# Patient Record
Sex: FEMALE | Race: WHITE | NOT HISPANIC OR LATINO | Employment: FULL TIME
[De-identification: names, ages, dates, MRNs, and addresses within clinical notes are randomized per-mention and may not be internally consistent; named-entity substitution may affect disease eponyms.]

---

## 2018-10-02 ENCOUNTER — HEALTH MAINTENANCE LETTER (OUTPATIENT)
Age: 43
End: 2018-10-02

## 2018-10-12 ENCOUNTER — ANESTHESIA (OUTPATIENT)
Dept: SURGERY | Facility: CLINIC | Age: 43
End: 2018-10-12
Payer: COMMERCIAL

## 2018-10-12 ENCOUNTER — HOSPITAL ENCOUNTER (OUTPATIENT)
Facility: CLINIC | Age: 43
Discharge: HOME | End: 2018-10-12
Attending: OBSTETRICS & GYNECOLOGY | Admitting: OBSTETRICS & GYNECOLOGY
Payer: COMMERCIAL

## 2018-10-12 ENCOUNTER — ANESTHESIA EVENT (OUTPATIENT)
Dept: SURGERY | Facility: CLINIC | Age: 43
End: 2018-10-12
Payer: COMMERCIAL

## 2018-10-12 ENCOUNTER — SURGERY (OUTPATIENT)
Age: 43
End: 2018-10-12

## 2018-10-12 VITALS
SYSTOLIC BLOOD PRESSURE: 113 MMHG | WEIGHT: 145.5 LBS | HEIGHT: 66 IN | TEMPERATURE: 99.7 F | BODY MASS INDEX: 23.38 KG/M2 | OXYGEN SATURATION: 99 % | RESPIRATION RATE: 14 BRPM | DIASTOLIC BLOOD PRESSURE: 72 MMHG

## 2018-10-12 DIAGNOSIS — R10.2 CYCLICAL PELVIC PAIN: Primary | ICD-10-CM

## 2018-10-12 DIAGNOSIS — Z90.710 STATUS POST LAPAROSCOPIC ASSISTED VAGINAL HYSTERECTOMY: ICD-10-CM

## 2018-10-12 LAB
ABO + RH BLD: NORMAL
ABO + RH BLD: NORMAL
B-HCG SERPL-ACNC: <1 IU/L (ref 0–5)
BLD GP AB SCN SERPL QL: NORMAL
BLOOD BANK CMNT PATIENT-IMP: NORMAL
HGB BLD-MCNC: 13.3 G/DL (ref 11.7–15.7)
SPECIMEN EXP DATE BLD: NORMAL

## 2018-10-12 PROCEDURE — 71000027 ZZH RECOVERY PHASE 2 EACH 15 MINS: Performed by: OBSTETRICS & GYNECOLOGY

## 2018-10-12 PROCEDURE — 88307 TISSUE EXAM BY PATHOLOGIST: CPT | Performed by: OBSTETRICS & GYNECOLOGY

## 2018-10-12 PROCEDURE — 36415 COLL VENOUS BLD VENIPUNCTURE: CPT | Performed by: OBSTETRICS & GYNECOLOGY

## 2018-10-12 PROCEDURE — 25000566 ZZH SEVOFLURANE, EA 15 MIN: Performed by: OBSTETRICS & GYNECOLOGY

## 2018-10-12 PROCEDURE — 25000128 H RX IP 250 OP 636: Performed by: OBSTETRICS & GYNECOLOGY

## 2018-10-12 PROCEDURE — 84702 CHORIONIC GONADOTROPIN TEST: CPT | Performed by: OBSTETRICS & GYNECOLOGY

## 2018-10-12 PROCEDURE — 25000128 H RX IP 250 OP 636: Performed by: NURSE ANESTHETIST, CERTIFIED REGISTERED

## 2018-10-12 PROCEDURE — 71000012 ZZH RECOVERY PHASE 1 LEVEL 1 FIRST HR: Performed by: OBSTETRICS & GYNECOLOGY

## 2018-10-12 PROCEDURE — 86850 RBC ANTIBODY SCREEN: CPT | Performed by: OBSTETRICS & GYNECOLOGY

## 2018-10-12 PROCEDURE — 27210794 ZZH OR GENERAL SUPPLY STERILE: Performed by: OBSTETRICS & GYNECOLOGY

## 2018-10-12 PROCEDURE — 25000125 ZZHC RX 250: Performed by: NURSE ANESTHETIST, CERTIFIED REGISTERED

## 2018-10-12 PROCEDURE — 86901 BLOOD TYPING SEROLOGIC RH(D): CPT | Performed by: OBSTETRICS & GYNECOLOGY

## 2018-10-12 PROCEDURE — 27210995 ZZH RX 272: Performed by: OBSTETRICS & GYNECOLOGY

## 2018-10-12 PROCEDURE — 36000063 ZZH SURGERY LEVEL 4 EA 15 ADDTL MIN: Performed by: OBSTETRICS & GYNECOLOGY

## 2018-10-12 PROCEDURE — 25000128 H RX IP 250 OP 636: Performed by: ANESTHESIOLOGY

## 2018-10-12 PROCEDURE — 37000009 ZZH ANESTHESIA TECHNICAL FEE, EACH ADDTL 15 MIN: Performed by: OBSTETRICS & GYNECOLOGY

## 2018-10-12 PROCEDURE — 25000132 ZZH RX MED GY IP 250 OP 250 PS 637: Performed by: OBSTETRICS & GYNECOLOGY

## 2018-10-12 PROCEDURE — 86900 BLOOD TYPING SEROLOGIC ABO: CPT | Performed by: OBSTETRICS & GYNECOLOGY

## 2018-10-12 PROCEDURE — 85018 HEMOGLOBIN: CPT | Performed by: OBSTETRICS & GYNECOLOGY

## 2018-10-12 PROCEDURE — 71000013 ZZH RECOVERY PHASE 1 LEVEL 1 EA ADDTL HR: Performed by: OBSTETRICS & GYNECOLOGY

## 2018-10-12 PROCEDURE — 37000008 ZZH ANESTHESIA TECHNICAL FEE, 1ST 30 MIN: Performed by: OBSTETRICS & GYNECOLOGY

## 2018-10-12 PROCEDURE — 25000125 ZZHC RX 250: Performed by: ANESTHESIOLOGY

## 2018-10-12 PROCEDURE — 36000093 ZZH SURGERY LEVEL 4 1ST 30 MIN: Performed by: OBSTETRICS & GYNECOLOGY

## 2018-10-12 PROCEDURE — C1765 ADHESION BARRIER: HCPCS | Performed by: OBSTETRICS & GYNECOLOGY

## 2018-10-12 PROCEDURE — 88307 TISSUE EXAM BY PATHOLOGIST: CPT | Mod: 26 | Performed by: OBSTETRICS & GYNECOLOGY

## 2018-10-12 PROCEDURE — 40000170 ZZH STATISTIC PRE-PROCEDURE ASSESSMENT II: Performed by: OBSTETRICS & GYNECOLOGY

## 2018-10-12 RX ORDER — VASOPRESSIN 20 U/ML
INJECTION PARENTERAL
Status: DISCONTINUED
Start: 2018-10-12 | End: 2018-10-12 | Stop reason: HOSPADM

## 2018-10-12 RX ORDER — HYDROMORPHONE HYDROCHLORIDE 1 MG/ML
.3-.5 INJECTION, SOLUTION INTRAMUSCULAR; INTRAVENOUS; SUBCUTANEOUS EVERY 10 MIN PRN
Status: DISCONTINUED | OUTPATIENT
Start: 2018-10-12 | End: 2018-10-12 | Stop reason: HOSPADM

## 2018-10-12 RX ORDER — OXYCODONE HYDROCHLORIDE 5 MG/1
5-10 TABLET ORAL
Qty: 20 TABLET | Refills: 0 | Status: SHIPPED | OUTPATIENT
Start: 2018-10-12 | End: 2019-10-24

## 2018-10-12 RX ORDER — MEPERIDINE HYDROCHLORIDE 25 MG/ML
12.5 INJECTION INTRAMUSCULAR; INTRAVENOUS; SUBCUTANEOUS
Status: DISCONTINUED | OUTPATIENT
Start: 2018-10-12 | End: 2018-10-12 | Stop reason: HOSPADM

## 2018-10-12 RX ORDER — ONDANSETRON 2 MG/ML
4 INJECTION INTRAMUSCULAR; INTRAVENOUS EVERY 30 MIN PRN
Status: DISCONTINUED | OUTPATIENT
Start: 2018-10-12 | End: 2018-10-12 | Stop reason: HOSPADM

## 2018-10-12 RX ORDER — FENTANYL CITRATE 50 UG/ML
INJECTION, SOLUTION INTRAMUSCULAR; INTRAVENOUS PRN
Status: DISCONTINUED | OUTPATIENT
Start: 2018-10-12 | End: 2018-10-12

## 2018-10-12 RX ORDER — KETOROLAC TROMETHAMINE 30 MG/ML
INJECTION, SOLUTION INTRAMUSCULAR; INTRAVENOUS PRN
Status: DISCONTINUED | OUTPATIENT
Start: 2018-10-12 | End: 2018-10-12

## 2018-10-12 RX ORDER — NALOXONE HYDROCHLORIDE 0.4 MG/ML
.1-.4 INJECTION, SOLUTION INTRAMUSCULAR; INTRAVENOUS; SUBCUTANEOUS
Status: DISCONTINUED | OUTPATIENT
Start: 2018-10-12 | End: 2018-10-12 | Stop reason: HOSPADM

## 2018-10-12 RX ORDER — ONDANSETRON 4 MG/1
4 TABLET, ORALLY DISINTEGRATING ORAL EVERY 30 MIN PRN
Status: DISCONTINUED | OUTPATIENT
Start: 2018-10-12 | End: 2018-10-12 | Stop reason: HOSPADM

## 2018-10-12 RX ORDER — SIMETHICONE 80 MG
80 TABLET,CHEWABLE ORAL EVERY 6 HOURS PRN
Qty: 30 TABLET | Refills: 0 | Status: SHIPPED | OUTPATIENT
Start: 2018-10-12 | End: 2019-10-24

## 2018-10-12 RX ORDER — FENTANYL CITRATE 50 UG/ML
25-50 INJECTION, SOLUTION INTRAMUSCULAR; INTRAVENOUS
Status: DISCONTINUED | OUTPATIENT
Start: 2018-10-12 | End: 2018-10-12 | Stop reason: HOSPADM

## 2018-10-12 RX ORDER — ACETAMINOPHEN 325 MG/1
650 TABLET ORAL EVERY 4 HOURS PRN
Qty: 100 TABLET | Refills: 0 | Status: SHIPPED | OUTPATIENT
Start: 2018-10-12 | End: 2019-10-24

## 2018-10-12 RX ORDER — ONDANSETRON 4 MG/1
4 TABLET, ORALLY DISINTEGRATING ORAL
Status: DISCONTINUED | OUTPATIENT
Start: 2018-10-12 | End: 2018-10-12 | Stop reason: HOSPADM

## 2018-10-12 RX ORDER — ONDANSETRON 2 MG/ML
INJECTION INTRAMUSCULAR; INTRAVENOUS PRN
Status: DISCONTINUED | OUTPATIENT
Start: 2018-10-12 | End: 2018-10-12

## 2018-10-12 RX ORDER — SODIUM CHLORIDE, SODIUM LACTATE, POTASSIUM CHLORIDE, CALCIUM CHLORIDE 600; 310; 30; 20 MG/100ML; MG/100ML; MG/100ML; MG/100ML
INJECTION, SOLUTION INTRAVENOUS CONTINUOUS
Status: DISCONTINUED | OUTPATIENT
Start: 2018-10-12 | End: 2018-10-12 | Stop reason: HOSPADM

## 2018-10-12 RX ORDER — PROPOFOL 10 MG/ML
INJECTION, EMULSION INTRAVENOUS CONTINUOUS PRN
Status: DISCONTINUED | OUTPATIENT
Start: 2018-10-12 | End: 2018-10-12

## 2018-10-12 RX ORDER — DEXAMETHASONE SODIUM PHOSPHATE 4 MG/ML
INJECTION, SOLUTION INTRA-ARTICULAR; INTRALESIONAL; INTRAMUSCULAR; INTRAVENOUS; SOFT TISSUE PRN
Status: DISCONTINUED | OUTPATIENT
Start: 2018-10-12 | End: 2018-10-12

## 2018-10-12 RX ORDER — LIDOCAINE HYDROCHLORIDE 20 MG/ML
INJECTION, SOLUTION INFILTRATION; PERINEURAL PRN
Status: DISCONTINUED | OUTPATIENT
Start: 2018-10-12 | End: 2018-10-12

## 2018-10-12 RX ORDER — OXYCODONE HYDROCHLORIDE 5 MG/1
5 TABLET ORAL
Status: COMPLETED | OUTPATIENT
Start: 2018-10-12 | End: 2018-10-12

## 2018-10-12 RX ORDER — SCOLOPAMINE TRANSDERMAL SYSTEM 1 MG/1
1 PATCH, EXTENDED RELEASE TRANSDERMAL ONCE
Status: COMPLETED | OUTPATIENT
Start: 2018-10-12 | End: 2018-10-12

## 2018-10-12 RX ORDER — CEFAZOLIN SODIUM 1 G/3ML
1 INJECTION, POWDER, FOR SOLUTION INTRAMUSCULAR; INTRAVENOUS SEE ADMIN INSTRUCTIONS
Status: DISCONTINUED | OUTPATIENT
Start: 2018-10-12 | End: 2018-10-12 | Stop reason: HOSPADM

## 2018-10-12 RX ORDER — SODIUM CHLORIDE, SODIUM LACTATE, POTASSIUM CHLORIDE, CALCIUM CHLORIDE 600; 310; 30; 20 MG/100ML; MG/100ML; MG/100ML; MG/100ML
INJECTION, SOLUTION INTRAVENOUS CONTINUOUS PRN
Status: DISCONTINUED | OUTPATIENT
Start: 2018-10-12 | End: 2018-10-12

## 2018-10-12 RX ORDER — ACETAMINOPHEN 325 MG/1
975 TABLET ORAL ONCE
Status: COMPLETED | OUTPATIENT
Start: 2018-10-12 | End: 2018-10-12

## 2018-10-12 RX ORDER — GLYCOPYRROLATE 0.2 MG/ML
INJECTION, SOLUTION INTRAMUSCULAR; INTRAVENOUS PRN
Status: DISCONTINUED | OUTPATIENT
Start: 2018-10-12 | End: 2018-10-12

## 2018-10-12 RX ORDER — IBUPROFEN 600 MG/1
600 TABLET, FILM COATED ORAL EVERY 6 HOURS PRN
Qty: 60 TABLET | Refills: 0 | Status: SHIPPED | OUTPATIENT
Start: 2018-10-12 | End: 2019-10-24

## 2018-10-12 RX ORDER — GABAPENTIN 300 MG/1
300 CAPSULE ORAL ONCE
Status: COMPLETED | OUTPATIENT
Start: 2018-10-12 | End: 2018-10-12

## 2018-10-12 RX ORDER — PROPOFOL 10 MG/ML
INJECTION, EMULSION INTRAVENOUS PRN
Status: DISCONTINUED | OUTPATIENT
Start: 2018-10-12 | End: 2018-10-12

## 2018-10-12 RX ORDER — CEFAZOLIN SODIUM 2 G/100ML
2 INJECTION, SOLUTION INTRAVENOUS
Status: DISCONTINUED | OUTPATIENT
Start: 2018-10-12 | End: 2018-10-12 | Stop reason: HOSPADM

## 2018-10-12 RX ORDER — NEOSTIGMINE METHYLSULFATE 1 MG/ML
VIAL (ML) INJECTION PRN
Status: DISCONTINUED | OUTPATIENT
Start: 2018-10-12 | End: 2018-10-12

## 2018-10-12 RX ORDER — BUPIVACAINE HYDROCHLORIDE 5 MG/ML
INJECTION, SOLUTION EPIDURAL; INTRACAUDAL
Status: DISCONTINUED
Start: 2018-10-12 | End: 2018-10-12 | Stop reason: HOSPADM

## 2018-10-12 RX ADMIN — NEOSTIGMINE METHYLSULFATE 3 MG: 1 INJECTION, SOLUTION INTRAVENOUS at 13:18

## 2018-10-12 RX ADMIN — PROPOFOL 200 MG: 10 INJECTION, EMULSION INTRAVENOUS at 11:28

## 2018-10-12 RX ADMIN — BUPIVACAINE HYDROCHLORIDE 6 ML: 5 INJECTION, SOLUTION PERINEURAL at 13:15

## 2018-10-12 RX ADMIN — FENTANYL CITRATE 50 MCG: 50 INJECTION, SOLUTION INTRAMUSCULAR; INTRAVENOUS at 14:08

## 2018-10-12 RX ADMIN — PROPOFOL 150 MCG/KG/MIN: 10 INJECTION, EMULSION INTRAVENOUS at 11:33

## 2018-10-12 RX ADMIN — FENTANYL CITRATE 50 MCG: 50 INJECTION, SOLUTION INTRAMUSCULAR; INTRAVENOUS at 13:49

## 2018-10-12 RX ADMIN — VASOPRESSIN 9 ML: 20 INJECTION INTRAVENOUS at 12:35

## 2018-10-12 RX ADMIN — SODIUM CHLORIDE, POTASSIUM CHLORIDE, SODIUM LACTATE AND CALCIUM CHLORIDE: 600; 310; 30; 20 INJECTION, SOLUTION INTRAVENOUS at 12:49

## 2018-10-12 RX ADMIN — ONDANSETRON 4 MG: 2 INJECTION INTRAMUSCULAR; INTRAVENOUS at 13:03

## 2018-10-12 RX ADMIN — ROCURONIUM BROMIDE 15 MG: 10 INJECTION INTRAVENOUS at 12:17

## 2018-10-12 RX ADMIN — HYDROMORPHONE HYDROCHLORIDE 0.25 MG: 1 INJECTION, SOLUTION INTRAMUSCULAR; INTRAVENOUS; SUBCUTANEOUS at 12:06

## 2018-10-12 RX ADMIN — SODIUM CHLORIDE, POTASSIUM CHLORIDE, SODIUM LACTATE AND CALCIUM CHLORIDE: 600; 310; 30; 20 INJECTION, SOLUTION INTRAVENOUS at 11:28

## 2018-10-12 RX ADMIN — DEXAMETHASONE SODIUM PHOSPHATE 4 MG: 4 INJECTION, SOLUTION INTRA-ARTICULAR; INTRALESIONAL; INTRAMUSCULAR; INTRAVENOUS; SOFT TISSUE at 11:33

## 2018-10-12 RX ADMIN — CEFAZOLIN 2 G: 1 INJECTION, POWDER, FOR SOLUTION INTRAMUSCULAR; INTRAVENOUS at 11:41

## 2018-10-12 RX ADMIN — KETOROLAC TROMETHAMINE 30 MG: 30 INJECTION, SOLUTION INTRAMUSCULAR at 13:03

## 2018-10-12 RX ADMIN — OXYCODONE HYDROCHLORIDE 5 MG: 5 TABLET ORAL at 15:01

## 2018-10-12 RX ADMIN — FENTANYL CITRATE 50 MCG: 50 INJECTION, SOLUTION INTRAMUSCULAR; INTRAVENOUS at 11:50

## 2018-10-12 RX ADMIN — LIDOCAINE HYDROCHLORIDE 100 MG: 20 INJECTION, SOLUTION INFILTRATION; PERINEURAL at 11:33

## 2018-10-12 RX ADMIN — DEXMEDETOMIDINE HYDROCHLORIDE 12 MCG: 100 INJECTION, SOLUTION INTRAVENOUS at 11:48

## 2018-10-12 RX ADMIN — GLYCOPYRROLATE 0.6 MG: 0.2 INJECTION, SOLUTION INTRAMUSCULAR; INTRAVENOUS at 13:18

## 2018-10-12 RX ADMIN — Medication 1 VIAL: at 13:25

## 2018-10-12 RX ADMIN — FENTANYL CITRATE 50 MCG: 50 INJECTION, SOLUTION INTRAMUSCULAR; INTRAVENOUS at 11:57

## 2018-10-12 RX ADMIN — SCOPALAMINE 1 PATCH: 1 PATCH, EXTENDED RELEASE TRANSDERMAL at 09:44

## 2018-10-12 RX ADMIN — ROCURONIUM BROMIDE 50 MG: 10 INJECTION INTRAVENOUS at 11:33

## 2018-10-12 RX ADMIN — HYDROMORPHONE HYDROCHLORIDE 0.25 MG: 1 INJECTION, SOLUTION INTRAMUSCULAR; INTRAVENOUS; SUBCUTANEOUS at 12:25

## 2018-10-12 RX ADMIN — ROCURONIUM BROMIDE 10 MG: 10 INJECTION INTRAVENOUS at 12:35

## 2018-10-12 RX ADMIN — DEXMEDETOMIDINE HYDROCHLORIDE 8 MCG: 100 INJECTION, SOLUTION INTRAVENOUS at 11:57

## 2018-10-12 RX ADMIN — MIDAZOLAM 2 MG: 1 INJECTION INTRAMUSCULAR; INTRAVENOUS at 11:28

## 2018-10-12 RX ADMIN — FENTANYL CITRATE 100 MCG: 50 INJECTION, SOLUTION INTRAMUSCULAR; INTRAVENOUS at 11:33

## 2018-10-12 RX ADMIN — Medication 0.5 MG: at 14:30

## 2018-10-12 RX ADMIN — GABAPENTIN 300 MG: 300 CAPSULE ORAL at 09:44

## 2018-10-12 RX ADMIN — ACETAMINOPHEN 975 MG: 325 TABLET, FILM COATED ORAL at 09:33

## 2018-10-12 RX ADMIN — ROCURONIUM BROMIDE 25 MG: 10 INJECTION INTRAVENOUS at 11:52

## 2018-10-12 RX ADMIN — ONDANSETRON 4 MG: 2 INJECTION INTRAMUSCULAR; INTRAVENOUS at 15:41

## 2018-10-12 ASSESSMENT — LIFESTYLE VARIABLES: TOBACCO_USE: 0

## 2018-10-12 NOTE — IP AVS SNAPSHOT
[facility]    [address]    [place] [address]    Phone:  [phone]    Fax:  [phone]                                       After Visit Summary   10/12/2018    Jamee Demarco    MRN: [id number]           After Visit Summary Signature Page     I have received my discharge instructions, and my questions have been answered. I have discussed any challenges I see with this plan with the nurse or doctor.    ..........................................................................................................................................  Patient/Patient Representative Signature      ..........................................................................................................................................  Patient Representative Print Name and Relationship to Patient    ..................................................               ................................................  Date                                   Time    ..........................................................................................................................................  Reviewed by Signature/Title    ...................................................              ..............................................  Date                                               Time          22EPIC Rev 08/18

## 2018-10-12 NOTE — DISCHARGE INSTRUCTIONS
Please call the office if you experience  - bleeding through more than one pad per hour persistently  - passage of blood clots greater than the size of jovan  - temperature greater than 100.4  - abnormal vaginal discharge  - inability to tolerate food or fluids  - pain uncontrolled with oral medications  - drainage, redness, or bleeding from incision    Your incisions have suture that will absorb under the skin and essentially a super glue of the skin on top that helps the skin stay together during the initial stages of healing.     You should take ibuprofen and acetaminophen alternating for the pain and oxycodone as needed for pain uncontrolled with those medications.     Simethicone will help with the gas pain that is common after laparoscopic surgery.     No driving while taking narcotic medications or within first week of surgery.    Please make an appointment to follow-up in the office in 2 and 6 weeks.    You will be out of work for likely 4 weeks. We can readdress at your 2 week postop appointment.         DISCHARGE INSTRUCTIONS  FOLLOWING LAPAROSCOPY    ACTIVITY:  You may resume normal activities including lifting as your abdominal discomfort disappears.  You may return to work after two days if you feel well enough.  Possible abdominal and shoulder discomfort due to gas remaining in the abdomen should be gone within 48 hours.  It is permissibleto climb stairs. Showers are perfectly acceptable.  You may drive a car after 24 hours as long as you are not taking narcotic pain pills.    CHECK-UP:  You should be seen one month after discharge unless home instruction sheet states otherwise and please phone the office the day after discharge and schedule an appointment with your physician.    VAGINAL DISCHARGE:  You may have some vaginal bleeding or discharge for about a week after discharge.  You should avoid douches, tampons, and intercourse for the first week.    TEMPERATURE:  If you develop temperature  levels to over 100.4  your physician should be called imlmediately.      STITCHES:  There is usually a stitch under the skin incisions which will dissolve and does not need to be removed.  The Band-Aids may be removed at any time.    DIET:  Como or light diet is advisable the day of surgery.  If nausea persists, continue this diet.  If severe, call.    Please call the office for increasingly severe abdominal pain or vaginal bleeding in excess of one pad per hour.  This will rarely be a problem.[email or web address]    [facility]  [address], [address]  [place], [place] [address]  [phone]    **If you have questions or concerns about your procedure,   call Dr Ina Fulton at  [phone]**      Same Day Surgery Discharge Instructions for  Sedation and General Anesthesia       It's not unusual to feel dizzy, light-headed or faint for up to 24 hours after surgery or while taking pain medication.  If you have these symptoms: sit for a few minutes before standing and have someone assist you when you get up to walk or use the bathroom.      You should rest and relax for the next 24 hours. We recommend you make arrangements to have an adult stay with you for at least 24 hours after your discharge.  Avoid hazardous and strenuous activity.      DO NOT DRIVE any vehicle or operate mechanical equipment for 24 hours following the end of your surgery.  Even though you may feel normal, your reactions may be affected by the medication you have received.      Do not drink alcoholic beverages for 24 hours following surgery.       Slowly progress to your regular diet as you feel able. It's not unusual to feel nauseated and/or vomit after receiving anesthesia.  If you develop these symptoms, drink clear liquids (apple juice, ginger ale, broth, 7-up, etc. ) until you feel better.  If your nausea and vomiting persists for 24 hours, please notify your surgeon.        All narcotic pain medications, along with inactivity and  anesthesia, can cause constipation. Drinking plenty of liquids and increasing fiber intake will help.      For any questions of a medical nature, call your surgeon.      Do not make important decisions for 24 hours.      If you had general anesthesia, you may have a sore throat for a couple of days related to the breathing tube used during surgery.  You may use Cepacol lozenges to help with this discomfort.  If it worsens or if you develop a fever, contact your surgeon.       If you feel your pain is not well managed with the pain medications prescribed by your surgeon, please contact your surgeon's office to let them know so they can address your concerns.       Information for Patients Discharging with a Transderm Scopolamine Patch       Dry mouth is a common side effect.    Drowsiness is another common side effect especially when combined with pain medication.  Please avoid activities that require mental alertness such as driving a car or making important legal decisions.    Since Scopolamine can cause temporary dilation of the pupils and blurred vision if it comes in contact with the eyes; be sure to wash your hands thoroughly with soap and water immediately after handling the patch.   When you remove your patch, please stick it to a tissue or paper towel for disposal.      Remove the patch immediately and contact a physician in the unlikely event that you experience symptoms of acute glaucoma (pain and reddening of the eyes, accompanied by dilated pupils).    Remove the patch if you develop any difficulties urinating.  If you cannot urinate after removing your patch, please notify your surgeon.    Remove the patch 24 hours after surgery.        Today you were given gabapentin (Neurontin) 300mg at 9:44 A.M.       Today you were given 975 mg of Tylenol at 9:33 A.M. The recommended daily maximum dose is 4000 mg.     Today you received Toradol, an antiinflammatory medication similar to Ibuprofen.  You should not  take other antiinflammatory medication, such as Ibuprofen, Motrin, Advil, Aleve, Naprosyn, etc until 7:00 P.M.

## 2018-10-12 NOTE — OP NOTE
"Gyn Operative Note    Jamee Demarco  1975 October 12, 2018      PREOPERATIVE DIAGNOSIS:  Cyclic pelvic pain after endometrial ablation and tubal ligation    POSTOPERATIVE DIAGNOSIS:  Same, small fibroids    PROCEDURE: Laparoscopic-assisted vaginal hysterectomy, bilateral salpingectomy, cystoscopy    ANESTHESIA:  General endotracheal    SURGEON:  Ina Fulton MD    ASSISTANT: MARICEL Ny    EBL: 150 mL    IVF: 1100 mL    UOP: 400 mL    FINDINGS: 8 wk size anteverted uterus with 2 subcentimeter subserosal fibroids at fundus, normal tubal remnants, normal ovaries, normal liver edge, normal stomach edge, normal bladder interior on cystoscopy at conclusion of the case, bilateral ureteral jets visualized on cystoscopy at conclusion of the case    SPECIMENS: portions of bilateral fallopian tubes, cervix, uterus    COMPLICATIONS: none    INDICATION AND CONSENT:   This is a 49 year old who presented for cyclic pelvic pain. She underwent endometrial ablation and bilateral tubal ligation in 2011. For about 7 months, she has been experiencing cyclic pelvic pain requiring scheduled pain medications throughout the days of the presumed \"period\", although she has had no vaginal bleeding. 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 Options for management including hormonal contraception, repeat endometrial ablation, and hysterectomy were discussed, including their risks and benefits. The patient desired definitive management via hysterectomy.Discussed risks of surgery including bleeding, infection, injury to surrounding structures, need for reoperation, need for blood transfusion. She expressed understanding and consented to proceed.     PROCEDURE:      The patient was brought to the operating room with IV fluids running. IV antibiotics were given for infection prophylaxis. General anesthesia was induced. She was prepped and draped in the dorsal lithotomy position. A timeout was performed to identify the " patient and procedure.      A Zheng catheter was placed to gravity. A speculum was placed in the vagina. The anterior lip of the cervix was grasped with a tenaculum. An Rui uterine manipulator was placed via the cervix. The speculum was removed.      Attention was directed to the patients abdomen. A 5 mm incision was created at the base of the umbilicus with a scalpel. The abdominal wall was elevated. The Veress needle was inserted at this incision. The CO2 gas tubing was attached to the Veress needle. Opening pressure was found to be 2 mmHg. The abdomen was insufflated to a pressure of 15 mmHg. The Veress needle was removed. The scope was inserted into a 5 mm Optiview trocar which was placed at the site of the incision so that the abdomen could be entered under direct visualization.     Bilateral 5 mm ports were placed with blunt trocars in the lower quadrants with care to avoid the inferior epigastric vessels.     The right fallopian tube remnant was grasped with an Alligator clamp. The Ligasure was used to resect the tube along the mesosalpinx and it was removed via the right lower quadrant port. The same was performed on the left.    The left round ligament was transected. The anterior leaf of the broad was dissected down anteriorly and the bladder flap developed.     The uteroovarian vessels were transected and the posterior leaf of the broad was developed, dissecting the uterine vessels. The uterine vessels were cauterized and cut.    These steps were repeated on the right side.    Hemostasis was confirmed at all sites.     Attention was then turned to the vagina. The manipulator was removed. A weighted speculum was placed posteriorly and a Armando anteriorly. The cervix was grasped with two vertical tenaculums. The cervix was injected circumferentially with vasopressin. A circumferential incision was made around the cervix with the Bovie. The anterior portion was grasped with an Allis clamp and the  pubocervical fascia was dissected anteriorly with a Metzenbaum scissors until the pelvis was entered anteriorly. The Armando was repositioned within this opening. The posterior cul de sac was then grasped with an Allis clamp and the pelvis was entered posteriorly. The weighted speculum was repositioned within this opening.     The uterosacral ligament was grasped with a Todd clamp on the right. This was cut with a Sal scissors and sutured with an 0 Vicryl pop-off and transfixed. The same was done on the right. This same process was repeated sequentially until the uterus was free from its pelvic attachments. The uterus was removed via the vagina.     The vaginal cuff with closed with 0 Vicryl in a running fashion, incorporating the uterosacral ligaments bilaterally. Hemostasis was achieved.    The Zheng catheter was removed. The cystoscope was obtained and placed into the bladder via the urethra with the findings noted above. The cystoscope was removed. The Zheng catheter was replaced.    Attention was turned again to the abdomen. The laparoscope was inserted and hemostasis was noted throughout the pelvis. The pelvis was copiously irrigated. Surgicel powder was placed over the vaginal cuff. The laparoscope was removed.     CO2 gas was allowed to escape from the abdomen.  The accessory ports were removed under direct visualization.  The umbilical port was removed. The skin of all incisions was closed with 4-0 Monocryl.     The vaginal cuff was palpated intact on vaginal exam and the Zheng catheter was removed.    The patient tolerated the procedure well. All counts were correct. The patient was brought to the recovery room in stable condition.    Ina Fulton MD

## 2018-10-12 NOTE — IP AVS SNAPSHOT
MRN:[id number]                      After Visit Summary   10/12/2018    Jamee Demarco    MRN: [id number]           Thank you!     Thank you for choosing [facility] for your care. Our goal is always to provide you with excellent care. Hearing back from our patients is one way we can continue to improve our services. Please take a few minutes to complete the written survey that you may receive in the mail after you visit with us. Thank you!        Patient Information     Date Of Birth          1975        About your hospital stay     You were admitted on:  October 12, 2018 [facility]    You were discharged on:  October 12, 2018       Who to Call     For medical emergencies, please call 911.  For non-urgent questions about your medical care, please call your primary care provider or clinic, [phone]  For questions related to your surgery, please call your surgery clinic        Attending Provider     Provider Ina Guerra MD OB/Gyn       Primary Care Provider Office Phone # Fax #    Karin Cárdenas [phone]      Further instructions from your care team         Please call the office if you experience  - bleeding through more than one pad per hour persistently  - passage of blood clots greater than the size of jovan  - temperature greater than 100.4  - abnormal vaginal discharge  - inability to tolerate food or fluids  - pain uncontrolled with oral medications  - drainage, redness, or bleeding from incision    Your incisions have suture that will absorb under the skin and essentially a super glue of the skin on top that helps the skin stay together during the initial stages of healing.     You should take ibuprofen and acetaminophen alternating for the pain and oxycodone as needed for pain uncontrolled with those medications.     Simethicone will help with the gas pain that is common after  laparoscopic surgery.     No driving while taking narcotic medications or within first week of surgery.    Please make an appointment to follow-up in the office in 2 and 6 weeks.    You will be out of work for likely 4 weeks. We can readdress at your 2 week postop appointment.         DISCHARGE INSTRUCTIONS  FOLLOWING LAPAROSCOPY    ACTIVITY:  You may resume normal activities including lifting as your abdominal discomfort disappears.  You may return to work after two days if you feel well enough.  Possible abdominal and shoulder discomfort due to gas remaining in the abdomen should be gone within 48 hours.  It is permissibleto climb stairs. Showers are perfectly acceptable.  You may drive a car after 24 hours as long as you are not taking narcotic pain pills.    CHECK-UP:  You should be seen one month after discharge unless home instruction sheet states otherwise and please phone the office the day after discharge and schedule an appointment with your physician.    VAGINAL DISCHARGE:  You may have some vaginal bleeding or discharge for about a week after discharge.  You should avoid douches, tampons, and intercourse for the first week.    TEMPERATURE:  If you develop temperature levels to over 100.4  your physician should be called imlmediately.      STITCHES:  There is usually a stitch under the skin incisions which will dissolve and does not need to be removed.  The Band-Aids may be removed at any time.    DIET:  [place] or light diet is advisable the day of surgery.  If nausea persists, continue this diet.  If severe, call.    Please call the office for increasingly severe abdominal pain or vaginal bleeding in excess of one pad per hour.  This will rarely be a problem.lapar    [facility]  [address], [address]  [place] [address]  [phone]    **If you have questions or concerns about your procedure,   call Dr Ina Fulton at  [phone]**      Same Day Surgery Discharge  Instructions for  Sedation and General Anesthesia       It's not unusual to feel dizzy, light-headed or faint for up to 24 hours after surgery or while taking pain medication.  If you have these symptoms: sit for a few minutes before standing and have someone assist you when you get up to walk or use the bathroom.      You should rest and relax for the next 24 hours. We recommend you make arrangements to have an adult stay with you for at least 24 hours after your discharge.  Avoid hazardous and strenuous activity.      DO NOT DRIVE any vehicle or operate mechanical equipment for 24 hours following the end of your surgery.  Even though you may feel normal, your reactions may be affected by the medication you have received.      Do not drink alcoholic beverages for 24 hours following surgery.       Slowly progress to your regular diet as you feel able. It's not unusual to feel nauseated and/or vomit after receiving anesthesia.  If you develop these symptoms, drink clear liquids (apple juice, ginger ale, broth, 7-up, etc. ) until you feel better.  If your nausea and vomiting persists for 24 hours, please notify your surgeon.        All narcotic pain medications, along with inactivity and anesthesia, can cause constipation. Drinking plenty of liquids and increasing fiber intake will help.      For any questions of a medical nature, call your surgeon.      Do not make important decisions for 24 hours.      If you had general anesthesia, you may have a sore throat for a couple of days related to the breathing tube used during surgery.  You may use Cepacol lozenges to help with this discomfort.  If it worsens or if you develop a fever, contact your surgeon.       If you feel your pain is not well managed with the pain medications prescribed by your surgeon, please contact your surgeon's office to let them know so they can address your concerns.       Information for Patients Discharging with a Transderm Scopolamine Patch  "      Dry mouth is a common side effect.    Drowsiness is another common side effect especially when combined with pain medication.  Please avoid activities that require mental alertness such as driving a car or making important legal decisions.    Since Scopolamine can cause temporary dilation of the pupils and blurred vision if it comes in contact with the eyes; be sure to wash your hands thoroughly with soap and water immediately after handling the patch.   When you remove your patch, please stick it to a tissue or paper towel for disposal.      Remove the patch immediately and contact a physician in the unlikely event that you experience symptoms of acute glaucoma (pain and reddening of the eyes, accompanied by dilated pupils).    Remove the patch if you develop any difficulties urinating.  If you cannot urinate after removing your patch, please notify your surgeon.    Remove the patch 24 hours after surgery.        Today you were given gabapentin (Neurontin) 300mg at 9:44 A.M.       Today you were given 975 mg of Tylenol at 9:33 A.M. The recommended daily maximum dose is 4000 mg.     Today you received Toradol, an antiinflammatory medication similar to Ibuprofen.  You should not take other antiinflammatory medication, such as Ibuprofen, Motrin, Advil, Aleve, Naprosyn, etc until 7:00 P.M.              Pending Results     Date and Time Order Name Status Description    10/12/2018 1252 Surgical pathology exam In process             Admission Information     Date & Time Provider Department Dept. Phone    10/12/2018 Ina Fulton MD [facility] Same Day Surgery [phone]      Your Vitals Were     Blood Pressure Temperature Respirations Height Weight Last Period    115/75 99.9  F (37.7  C) 14 1.676 m (5' 6\") 66 kg (145 lb 8 oz) 09/14/2018    Pulse Oximetry BMI (Body Mass Index)                96% 23.48 kg/m2          [facility] Information     [employer] lets you send messages to your doctor, view your test " "results, renew your prescriptions, schedule appointments and more. To sign up, go to [email or web address] . Click on \"Log in\" on the left side of the screen, which will take you to the Welcome page. Then click on \"Sign up Now\" on the right side of the page.     You will be asked to enter the access code listed below, as well as some personal information. Please follow the directions to create your username and password.     Your access code is: [email or web address]  Expires: 1/10/2019  1:54 PM     Your access code will  in 90 days. If you need help or a new code, please call your [facility] clinic or [phone].        Care EveryWhere ID     This is your Care EveryWhere ID. This could be used by other organizations to access your [facility] medical records  [id number]        Equal Access to Services     DANIEL SCHNEIDER : Naina Oro, bridgette still, dread hinton, beti tuttle . So [facility] [phone].    ATENCIÓN: Si habla español, tiene a pearson disposición servicios gratuitos de asistencia lingüística. Llame al [phone].    We comply with applicable federal civil rights laws and [place] laws. We do not discriminate on the basis of race, color, national origin, age, disability, sex, sexual orientation, or gender identity.               Review of your medicines      START taking        Dose / Directions    ibuprofen 600 MG tablet   Commonly known as:  ADVIL/MOTRIN   Used for:  Status post laparoscopic assisted vaginal hysterectomy   Notes to Patient:  May take at 7:00pm tonight.        Dose:  600 mg   Take 1 tablet (600 mg) by mouth every 6 hours as needed for pain (mild)   Quantity:  60 tablet   Refills:  0       oxyCODONE IR 5 MG tablet   Commonly known as:  ROXICODONE   Used for:  Status post laparoscopic assisted vaginal hysterectomy   Notes to Patient:  Had 5 mg at 3 PM        Dose:  5-10 mg   Take 1-2 tablets (5-10 mg) by mouth every 3 hours as needed " for moderate to severe pain (Moderate to Severe)   Quantity:  20 tablet   Refills:  0       simethicone 80 MG chewable tablet   Commonly known as:  MYLICON   Used for:  Status post laparoscopic assisted vaginal hysterectomy        Dose:  80 mg   Take 1 tablet (80 mg) by mouth every 6 hours as needed for flatulence or cramping   Quantity:  30 tablet   Refills:  0         CONTINUE these medicines which may have CHANGED, or have new prescriptions. If we are uncertain of the size of tablets/capsules you have at home, strength may be listed as something that might have changed.        Dose / Directions    acetaminophen 325 MG tablet   Commonly known as:  TYLENOL   This may have changed:    - medication strength  - how much to take  - when to take this  - reasons to take this   Used for:  Status post laparoscopic assisted vaginal hysterectomy        Dose:  650 mg   Take 2 tablets (650 mg) by mouth every 4 hours as needed for other (mild pain)   Quantity:  100 tablet   Refills:  0         STOP taking     ALEVE PO                Where to get your medicines      Some of these will need a paper prescription and others can be bought over the counter. Ask your nurse if you have questions.     Bring a paper prescription for each of these medications     acetaminophen 325 MG tablet    ibuprofen 600 MG tablet    oxyCODONE IR 5 MG tablet    simethicone 80 MG chewable tablet                Protect others around you: Learn how to safely use, store and throw away your medicines at www.disposemymeds.org.        Information about OPIOIDS     PRESCRIPTION OPIOIDS: WHAT YOU NEED TO KNOW   We gave you an opioid (narcotic) pain medicine. It is important to manage your pain, but opioids are not always the best choice. You should first try all the other options your care team gave you. Take this medicine for as short a time (and as few doses) as possible.    Some activities can increase your pain, such as bandage changes or therapy sessions.  It may help to take your pain medicine 30 to 60 minutes before these activities. Reduce your stress by getting enough sleep, working on hobbies you enjoy and practicing relaxation or meditation. Talk to your care team about ways to manage your pain beyond prescription opioids.    These medicines have risks:    DO NOT drive when on new or higher doses of pain medicine. These medicines can affect your alertness and reaction times, and you could be arrested for driving under the influence (DUI). If you need to use opioids long-term, talk to your care team about driving.    DO NOT operate heavy machinery    DO NOT do any other dangerous activities while taking these medicines.    DO NOT drink any alcohol while taking these medicines.     If the opioid prescribed includes acetaminophen, DO NOT take with any other medicines that contain acetaminophen. Read all labels carefully. Look for the word  acetaminophen  or  Tylenol.  Ask your pharmacist if you have questions or are unsure.    You can get addicted to pain medicines, especially if you have a history of addiction (chemical, alcohol or substance dependence). Talk to your care team about ways to reduce this risk.    All opioids tend to cause constipation. Drink plenty of water and eat foods that have a lot of fiber, such as fruits, vegetables, prune juice, apple juice and high-fiber cereal. Take a laxative (Miralax, milk of magnesia, Colace, Senna) if you don t move your bowels at least every other day. Other side effects include upset stomach, sleepiness, dizziness, throwing up, tolerance (needing more of the medicine to have the same effect), physical dependence and slowed breathing.    Store your pills in a secure place, locked if possible. We will not replace any lost or stolen medicine. If you don t finish your medicine, please throw away (dispose) as directed by your pharmacist. The [place] Pollution Control Agency has more information about safe disposal:  https://www.pca.[facility].mn.us/living-green/managing-unwanted-medications             Medication List: This is a list of all your medications and when to take them. Check marks below indicate your daily home schedule. Keep this list as a reference.      Medications           Morning Afternoon Evening Bedtime As Needed    acetaminophen 325 MG tablet   Commonly known as:  TYLENOL   Take 2 tablets (650 mg) by mouth every 4 hours as needed for other (mild pain)   Last time this was given:  975 mg on 10/12/2018  9:33 AM                                ibuprofen 600 MG tablet   Commonly known as:  ADVIL/MOTRIN   Take 1 tablet (600 mg) by mouth every 6 hours as needed for pain (mild)   Notes to Patient:  May take at 7:00pm tonight.                                oxyCODONE IR 5 MG tablet   Commonly known as:  ROXICODONE   Take 1-2 tablets (5-10 mg) by mouth every 3 hours as needed for moderate to severe pain (Moderate to Severe)   Last time this was given:  5 mg on 10/12/2018  3:01 PM   Notes to Patient:  Had 5 mg at 3 PM                                simethicone 80 MG chewable tablet   Commonly known as:  MYLICON   Take 1 tablet (80 mg) by mouth every 6 hours as needed for flatulence or cramping

## 2018-10-12 NOTE — ANESTHESIA POSTPROCEDURE EVALUATION
Patient: Jamee Demarco    Procedure(s):  LAPAROSCOPIC ASSISTED VAGINAL HYSTERECTOMY, BILATERAL SALPINGECTOMY,  CYSTOSCOPY - Wound Class: II-Clean Contaminated   - Wound Class: II-Clean Contaminated   - Wound Class: II-Clean Contaminated    Diagnosis:PELVIC PAIN AND DYSFUNCTIONAL UTERINE BLEEDING   Diagnosis Additional Information: No value filed.    Anesthesia Type:  General, ETT    Note:  Anesthesia Post Evaluation    Patient location during evaluation: PACU  Patient participation: Able to fully participate in evaluation  Level of consciousness: awake  Pain management: adequate  Airway patency: patent  Cardiovascular status: acceptable  Respiratory status: acceptable  Hydration status: acceptable  PONV: none     Anesthetic complications: None          Last vitals:  Vitals:    10/12/18 1400 10/12/18 1408 10/12/18 1415   BP: 123/83  117/77   Resp: 15 16 12   Temp: 37.4  C (99.3  F)  37.5  C (99.5  F)   SpO2: 100% 99% 94%         Electronically Signed By: Cece Weaver  October 12, 2018  2:30 PM

## 2018-10-12 NOTE — ANESTHESIA CARE TRANSFER NOTE
Patient: Jamee Demarco    Procedure(s):  LAPAROSCOPIC ASSISTED VAGINAL HYSTERECTOMY, BILATERAL SALPINGECTOMY,  CYSTOSCOPY - Wound Class: II-Clean Contaminated   - Wound Class: II-Clean Contaminated   - Wound Class: II-Clean Contaminated    Diagnosis: PELVIC PAIN AND DYSFUNCTIONAL UTERINE BLEEDING   Diagnosis Additional Information: No value filed.    Anesthesia Type:   General, ETT     Note:  Airway :ETT  Patient transferred to:PACU  Comments: Neuromuscular blockade reversed after TOF 4/4, spontaneous respirations, adequate tidal volumes, followed commands to voice, oropharynx suctioned with soft flexible catheter, airway patent after extubation.  Oxygen via facemask at 8 liters per minute to PACU. Oxygen tubing connected to wall O2 in PACU, SpO2, NiBP, and EKG monitors and alarms on and functioning, Red Hugger warmer connected to patient gown, report on patient's clinical status given to PACU RN, RN questions answered. Handoff Report: Identifed the Patient, Identified the Reponsible Provider, Reviewed the pertinent medical history, Discussed the surgical course, Reviewed Intra-OP anesthesia mangement and issues during anesthesia, Set expectations for post-procedure period and Allowed opportunity for questions and acknowledgement of understanding      Vitals: (Last set prior to Anesthesia Care Transfer)    CRNA VITALS  10/12/2018 1303 - 10/12/2018 1339      10/12/2018             Pulse: 83    SpO2: 100 %    Resp Rate (observed): 14                Electronically Signed By: DULCE Schneider CRNA  October 12, 2018  1:39 PM

## 2018-10-12 NOTE — OR NURSING
Glasses returned to pt.  PNDS met, po per I&O sheet. Pt dressed, up in recliner.  C/O nausea, color pale.  1541 IV Zofran given.  Will hold off on transporting to phase 2.  1605  Patient slept for 25 minutes in recliner chair with cold wash cloth on her forehead.  Now awake.  States nausea improving. Color better.  Sitting up and taking sips of water.    1613 PNDS met, po per I&O sheet. Pt transported to Phase 2.

## 2018-10-12 NOTE — ANESTHESIA PREPROCEDURE EVALUATION
Anesthesia Evaluation     . Pt has had prior anesthetic.     History of anesthetic complications   - PONV        ROS/MED HX    ENT/Pulmonary:      (-) tobacco use, asthma and sleep apnea   Neurologic:       Cardiovascular:         METS/Exercise Tolerance:     Hematologic:         Musculoskeletal:         GI/Hepatic:        (-) GERD   Renal/Genitourinary:         Endo:         Psychiatric:         Infectious Disease:         Malignancy:         Other:                     Physical Exam  Normal systems: dental    Airway   Mallampati: I  TM distance: >3 FB  Neck ROM: full    Dental     Cardiovascular   Rhythm and rate: regular      Pulmonary    breath sounds clear to auscultation                    Anesthesia Plan      History & Physical Review  History and physical reviewed and following examination; no interval change.    ASA Status:  1 .        Plan for General and ETT with Intravenous induction. Maintenance will be Balanced.    PONV prophylaxis:  Ondansetron (or other 5HT-3), Dexamethasone or Solumedrol and Scopolamine patch  Propofol infusion      Postoperative Care  Postoperative pain management:  IV analgesics.      Consents  Anesthetic plan, risks, benefits and alternatives discussed with:  Patient..                          .

## 2018-10-15 LAB — COPATH REPORT: NORMAL

## 2019-10-24 ENCOUNTER — APPOINTMENT (OUTPATIENT)
Dept: CT IMAGING | Facility: CLINIC | Age: 44
End: 2019-10-24
Attending: EMERGENCY MEDICINE
Payer: COMMERCIAL

## 2019-10-24 ENCOUNTER — HOSPITAL ENCOUNTER (OUTPATIENT)
Facility: CLINIC | Age: 44
Discharge: HOME | End: 2019-10-24
Attending: EMERGENCY MEDICINE | Admitting: OBSTETRICS & GYNECOLOGY
Payer: COMMERCIAL

## 2019-10-24 ENCOUNTER — APPOINTMENT (OUTPATIENT)
Dept: ULTRASOUND IMAGING | Facility: CLINIC | Age: 44
End: 2019-10-24
Attending: EMERGENCY MEDICINE
Payer: COMMERCIAL

## 2019-10-24 ENCOUNTER — ANESTHESIA EVENT (OUTPATIENT)
Dept: SURGERY | Facility: CLINIC | Age: 44
End: 2019-10-24
Payer: COMMERCIAL

## 2019-10-24 ENCOUNTER — ANESTHESIA (OUTPATIENT)
Dept: SURGERY | Facility: CLINIC | Age: 44
End: 2019-10-24
Payer: COMMERCIAL

## 2019-10-24 VITALS
OXYGEN SATURATION: 97 % | WEIGHT: 145 LBS | SYSTOLIC BLOOD PRESSURE: 124 MMHG | DIASTOLIC BLOOD PRESSURE: 67 MMHG | TEMPERATURE: 98.8 F | BODY MASS INDEX: 23.4 KG/M2 | RESPIRATION RATE: 16 BRPM | HEART RATE: 64 BPM

## 2019-10-24 DIAGNOSIS — N83.519 OVARIAN TORSION: Primary | ICD-10-CM

## 2019-10-24 DIAGNOSIS — R10.31 RLQ ABDOMINAL PAIN: ICD-10-CM

## 2019-10-24 LAB
ABO + RH BLD: NORMAL
ABO + RH BLD: NORMAL
ALBUMIN SERPL-MCNC: 3.9 G/DL (ref 3.4–5)
ALBUMIN UR-MCNC: 10 MG/DL
ALP SERPL-CCNC: 46 U/L (ref 40–150)
ALT SERPL W P-5'-P-CCNC: 15 U/L (ref 0–50)
ANION GAP SERPL CALCULATED.3IONS-SCNC: 5 MMOL/L (ref 3–14)
APPEARANCE UR: CLEAR
AST SERPL W P-5'-P-CCNC: 13 U/L (ref 0–45)
BASOPHILS # BLD AUTO: 0 10E9/L (ref 0–0.2)
BASOPHILS NFR BLD AUTO: 0.6 %
BILIRUB SERPL-MCNC: 0.5 MG/DL (ref 0.2–1.3)
BILIRUB UR QL STRIP: NEGATIVE
BLD GP AB SCN SERPL QL: NORMAL
BLOOD BANK CMNT PATIENT-IMP: NORMAL
BUN SERPL-MCNC: 8 MG/DL (ref 7–30)
CALCIUM SERPL-MCNC: 8.9 MG/DL (ref 8.5–10.1)
CHLORIDE SERPL-SCNC: 107 MMOL/L (ref 94–109)
CO2 SERPL-SCNC: 25 MMOL/L (ref 20–32)
COLOR UR AUTO: YELLOW
CREAT BLD-MCNC: 0.5 MG/DL (ref 0.52–1.04)
CREAT SERPL-MCNC: 0.53 MG/DL (ref 0.52–1.04)
DIFFERENTIAL METHOD BLD: NORMAL
EOSINOPHIL # BLD AUTO: 0 10E9/L (ref 0–0.7)
EOSINOPHIL NFR BLD AUTO: 0.1 %
ERYTHROCYTE [DISTWIDTH] IN BLOOD BY AUTOMATED COUNT: 12.1 % (ref 10–15)
GFR SERPL CREATININE-BSD FRML MDRD: >90 ML/MIN/{1.73_M2}
GFR SERPL CREATININE-BSD FRML MDRD: >90 ML/MIN/{1.73_M2}
GLUCOSE SERPL-MCNC: 126 MG/DL (ref 70–99)
GLUCOSE UR STRIP-MCNC: NEGATIVE MG/DL
HCG UR QL: NEGATIVE
HCT VFR BLD AUTO: 40.6 % (ref 35–47)
HGB BLD-MCNC: 14 G/DL (ref 11.7–15.7)
HGB UR QL STRIP: NEGATIVE
IMM GRANULOCYTES # BLD: 0 10E9/L (ref 0–0.4)
IMM GRANULOCYTES NFR BLD: 0.1 %
KETONES UR STRIP-MCNC: 10 MG/DL
LEUKOCYTE ESTERASE UR QL STRIP: NEGATIVE
LIPASE SERPL-CCNC: 95 U/L (ref 73–393)
LYMPHOCYTES # BLD AUTO: 1.4 10E9/L (ref 0.8–5.3)
LYMPHOCYTES NFR BLD AUTO: 19.6 %
MCH RBC QN AUTO: 30.6 PG (ref 26.5–33)
MCHC RBC AUTO-ENTMCNC: 34.5 G/DL (ref 31.5–36.5)
MCV RBC AUTO: 89 FL (ref 78–100)
MONOCYTES # BLD AUTO: 0.2 10E9/L (ref 0–1.3)
MONOCYTES NFR BLD AUTO: 3.2 %
MUCOUS THREADS #/AREA URNS LPF: PRESENT /LPF
NEUTROPHILS # BLD AUTO: 5.5 10E9/L (ref 1.6–8.3)
NEUTROPHILS NFR BLD AUTO: 76.4 %
NITRATE UR QL: NEGATIVE
PH UR STRIP: 5.5 PH (ref 5–7)
PLATELET # BLD AUTO: 306 10E9/L (ref 150–450)
POTASSIUM SERPL-SCNC: 3.5 MMOL/L (ref 3.4–5.3)
PROT SERPL-MCNC: 7.3 G/DL (ref 6.8–8.8)
RBC # BLD AUTO: 4.57 10E12/L (ref 3.8–5.2)
RBC #/AREA URNS AUTO: 1 /HPF (ref 0–2)
SODIUM SERPL-SCNC: 137 MMOL/L (ref 133–144)
SOURCE: ABNORMAL
SP GR UR STRIP: 1.03 (ref 1–1.03)
SPECIMEN EXP DATE BLD: NORMAL
SQUAMOUS #/AREA URNS AUTO: 6 /HPF (ref 0–1)
UROBILINOGEN UR STRIP-MCNC: NORMAL MG/DL (ref 0–2)
WBC # BLD AUTO: 7.2 10E9/L (ref 4–11)
WBC #/AREA URNS AUTO: 2 /HPF (ref 0–5)

## 2019-10-24 PROCEDURE — 74177 CT ABD & PELVIS W/CONTRAST: CPT

## 2019-10-24 PROCEDURE — 25000128 H RX IP 250 OP 636: Performed by: REGISTERED NURSE

## 2019-10-24 PROCEDURE — 81025 URINE PREGNANCY TEST: CPT | Performed by: EMERGENCY MEDICINE

## 2019-10-24 PROCEDURE — 86900 BLOOD TYPING SEROLOGIC ABO: CPT | Performed by: OBSTETRICS & GYNECOLOGY

## 2019-10-24 PROCEDURE — 25000128 H RX IP 250 OP 636: Performed by: EMERGENCY MEDICINE

## 2019-10-24 PROCEDURE — 37000009 ZZH ANESTHESIA TECHNICAL FEE, EACH ADDTL 15 MIN: Performed by: OBSTETRICS & GYNECOLOGY

## 2019-10-24 PROCEDURE — 37000008 ZZH ANESTHESIA TECHNICAL FEE, 1ST 30 MIN: Performed by: OBSTETRICS & GYNECOLOGY

## 2019-10-24 PROCEDURE — 82565 ASSAY OF CREATININE: CPT

## 2019-10-24 PROCEDURE — 36000056 ZZH SURGERY LEVEL 3 1ST 30 MIN: Performed by: OBSTETRICS & GYNECOLOGY

## 2019-10-24 PROCEDURE — 25800025 ZZH RX 258: Performed by: OBSTETRICS & GYNECOLOGY

## 2019-10-24 PROCEDURE — 99285 EMERGENCY DEPT VISIT HI MDM: CPT | Mod: 25

## 2019-10-24 PROCEDURE — 96375 TX/PRO/DX INJ NEW DRUG ADDON: CPT

## 2019-10-24 PROCEDURE — 25800030 ZZH RX IP 258 OP 636: Performed by: ANESTHESIOLOGY

## 2019-10-24 PROCEDURE — 86850 RBC ANTIBODY SCREEN: CPT | Performed by: OBSTETRICS & GYNECOLOGY

## 2019-10-24 PROCEDURE — 36000058 ZZH SURGERY LEVEL 3 EA 15 ADDTL MIN: Performed by: OBSTETRICS & GYNECOLOGY

## 2019-10-24 PROCEDURE — 85025 COMPLETE CBC W/AUTO DIFF WBC: CPT | Performed by: EMERGENCY MEDICINE

## 2019-10-24 PROCEDURE — 25000125 ZZHC RX 250: Performed by: NURSE ANESTHETIST, CERTIFIED REGISTERED

## 2019-10-24 PROCEDURE — 25000125 ZZHC RX 250: Performed by: REGISTERED NURSE

## 2019-10-24 PROCEDURE — 40000170 ZZH STATISTIC PRE-PROCEDURE ASSESSMENT II: Performed by: OBSTETRICS & GYNECOLOGY

## 2019-10-24 PROCEDURE — 25000125 ZZHC RX 250: Performed by: OBSTETRICS & GYNECOLOGY

## 2019-10-24 PROCEDURE — 86901 BLOOD TYPING SEROLOGIC RH(D): CPT | Performed by: OBSTETRICS & GYNECOLOGY

## 2019-10-24 PROCEDURE — 96361 HYDRATE IV INFUSION ADD-ON: CPT

## 2019-10-24 PROCEDURE — 25000128 H RX IP 250 OP 636: Performed by: ANESTHESIOLOGY

## 2019-10-24 PROCEDURE — 81001 URINALYSIS AUTO W/SCOPE: CPT | Performed by: EMERGENCY MEDICINE

## 2019-10-24 PROCEDURE — 71000012 ZZH RECOVERY PHASE 1 LEVEL 1 FIRST HR: Performed by: OBSTETRICS & GYNECOLOGY

## 2019-10-24 PROCEDURE — 25800030 ZZH RX IP 258 OP 636: Performed by: REGISTERED NURSE

## 2019-10-24 PROCEDURE — 83690 ASSAY OF LIPASE: CPT | Performed by: EMERGENCY MEDICINE

## 2019-10-24 PROCEDURE — 25000128 H RX IP 250 OP 636: Performed by: NURSE ANESTHETIST, CERTIFIED REGISTERED

## 2019-10-24 PROCEDURE — 25000125 ZZHC RX 250: Performed by: EMERGENCY MEDICINE

## 2019-10-24 PROCEDURE — 93976 VASCULAR STUDY: CPT

## 2019-10-24 PROCEDURE — 27210794 ZZH OR GENERAL SUPPLY STERILE: Performed by: OBSTETRICS & GYNECOLOGY

## 2019-10-24 PROCEDURE — 80053 COMPREHEN METABOLIC PANEL: CPT | Performed by: EMERGENCY MEDICINE

## 2019-10-24 PROCEDURE — 25000128 H RX IP 250 OP 636: Performed by: OBSTETRICS & GYNECOLOGY

## 2019-10-24 PROCEDURE — 96374 THER/PROPH/DIAG INJ IV PUSH: CPT | Mod: 59

## 2019-10-24 RX ORDER — OXYCODONE HYDROCHLORIDE 5 MG/1
5 TABLET ORAL
Status: DISCONTINUED | OUTPATIENT
Start: 2019-10-24 | End: 2019-10-24 | Stop reason: HOSPADM

## 2019-10-24 RX ORDER — HYDROMORPHONE HYDROCHLORIDE 1 MG/ML
.3-.5 INJECTION, SOLUTION INTRAMUSCULAR; INTRAVENOUS; SUBCUTANEOUS EVERY 10 MIN PRN
Status: DISCONTINUED | OUTPATIENT
Start: 2019-10-24 | End: 2019-10-24 | Stop reason: HOSPADM

## 2019-10-24 RX ORDER — KETOROLAC TROMETHAMINE 30 MG/ML
30 INJECTION, SOLUTION INTRAMUSCULAR; INTRAVENOUS EVERY 6 HOURS PRN
Status: DISCONTINUED | OUTPATIENT
Start: 2019-10-24 | End: 2019-10-24 | Stop reason: HOSPADM

## 2019-10-24 RX ORDER — LIDOCAINE HYDROCHLORIDE 20 MG/ML
INJECTION, SOLUTION INFILTRATION; PERINEURAL PRN
Status: DISCONTINUED | OUTPATIENT
Start: 2019-10-24 | End: 2019-10-24

## 2019-10-24 RX ORDER — ACETAMINOPHEN 325 MG/1
650 TABLET ORAL EVERY 4 HOURS PRN
Qty: 50 TABLET | Refills: 0 | Status: SHIPPED | OUTPATIENT
Start: 2019-10-24

## 2019-10-24 RX ORDER — ONDANSETRON 4 MG/1
4 TABLET, ORALLY DISINTEGRATING ORAL EVERY 30 MIN PRN
Status: DISCONTINUED | OUTPATIENT
Start: 2019-10-24 | End: 2019-10-24 | Stop reason: HOSPADM

## 2019-10-24 RX ORDER — ONDANSETRON 2 MG/ML
4 INJECTION INTRAMUSCULAR; INTRAVENOUS EVERY 30 MIN PRN
Status: DISCONTINUED | OUTPATIENT
Start: 2019-10-24 | End: 2019-10-24 | Stop reason: HOSPADM

## 2019-10-24 RX ORDER — HYDROMORPHONE HYDROCHLORIDE 1 MG/ML
0.5 INJECTION, SOLUTION INTRAMUSCULAR; INTRAVENOUS; SUBCUTANEOUS
Status: DISCONTINUED | OUTPATIENT
Start: 2019-10-24 | End: 2019-10-24 | Stop reason: HOSPADM

## 2019-10-24 RX ORDER — GLYCOPYRROLATE 0.2 MG/ML
INJECTION, SOLUTION INTRAMUSCULAR; INTRAVENOUS PRN
Status: DISCONTINUED | OUTPATIENT
Start: 2019-10-24 | End: 2019-10-24

## 2019-10-24 RX ORDER — ONDANSETRON 2 MG/ML
INJECTION INTRAMUSCULAR; INTRAVENOUS PRN
Status: DISCONTINUED | OUTPATIENT
Start: 2019-10-24 | End: 2019-10-24

## 2019-10-24 RX ORDER — NALOXONE HYDROCHLORIDE 0.4 MG/ML
.1-.4 INJECTION, SOLUTION INTRAMUSCULAR; INTRAVENOUS; SUBCUTANEOUS
Status: DISCONTINUED | OUTPATIENT
Start: 2019-10-24 | End: 2019-10-24 | Stop reason: HOSPADM

## 2019-10-24 RX ORDER — MAGNESIUM HYDROXIDE 1200 MG/15ML
LIQUID ORAL PRN
Status: DISCONTINUED | OUTPATIENT
Start: 2019-10-24 | End: 2019-10-24 | Stop reason: HOSPADM

## 2019-10-24 RX ORDER — HYDRALAZINE HYDROCHLORIDE 20 MG/ML
2.5-5 INJECTION INTRAMUSCULAR; INTRAVENOUS EVERY 10 MIN PRN
Status: DISCONTINUED | OUTPATIENT
Start: 2019-10-24 | End: 2019-10-24 | Stop reason: HOSPADM

## 2019-10-24 RX ORDER — DEXAMETHASONE SODIUM PHOSPHATE 4 MG/ML
4 INJECTION, SOLUTION INTRA-ARTICULAR; INTRALESIONAL; INTRAMUSCULAR; INTRAVENOUS; SOFT TISSUE EVERY 10 MIN PRN
Status: DISCONTINUED | OUTPATIENT
Start: 2019-10-24 | End: 2019-10-24 | Stop reason: HOSPADM

## 2019-10-24 RX ORDER — SODIUM CHLORIDE, SODIUM LACTATE, POTASSIUM CHLORIDE, CALCIUM CHLORIDE 600; 310; 30; 20 MG/100ML; MG/100ML; MG/100ML; MG/100ML
INJECTION, SOLUTION INTRAVENOUS CONTINUOUS
Status: DISCONTINUED | OUTPATIENT
Start: 2019-10-24 | End: 2019-10-24 | Stop reason: HOSPADM

## 2019-10-24 RX ORDER — FENTANYL CITRATE 50 UG/ML
INJECTION, SOLUTION INTRAMUSCULAR; INTRAVENOUS PRN
Status: DISCONTINUED | OUTPATIENT
Start: 2019-10-24 | End: 2019-10-24

## 2019-10-24 RX ORDER — PROPOFOL 10 MG/ML
INJECTION, EMULSION INTRAVENOUS PRN
Status: DISCONTINUED | OUTPATIENT
Start: 2019-10-24 | End: 2019-10-24

## 2019-10-24 RX ORDER — PROPOFOL 10 MG/ML
INJECTION, EMULSION INTRAVENOUS CONTINUOUS PRN
Status: DISCONTINUED | OUTPATIENT
Start: 2019-10-24 | End: 2019-10-24

## 2019-10-24 RX ORDER — FENTANYL CITRATE 50 UG/ML
25-50 INJECTION, SOLUTION INTRAMUSCULAR; INTRAVENOUS
Status: DISCONTINUED | OUTPATIENT
Start: 2019-10-24 | End: 2019-10-24 | Stop reason: HOSPADM

## 2019-10-24 RX ORDER — BUPIVACAINE HYDROCHLORIDE 2.5 MG/ML
INJECTION, SOLUTION INFILTRATION; PERINEURAL PRN
Status: DISCONTINUED | OUTPATIENT
Start: 2019-10-24 | End: 2019-10-24 | Stop reason: HOSPADM

## 2019-10-24 RX ORDER — IBUPROFEN 600 MG/1
600 TABLET, FILM COATED ORAL EVERY 6 HOURS PRN
Qty: 30 TABLET | Refills: 0 | Status: SHIPPED | OUTPATIENT
Start: 2019-10-24

## 2019-10-24 RX ORDER — LABETALOL HYDROCHLORIDE 5 MG/ML
10 INJECTION, SOLUTION INTRAVENOUS
Status: DISCONTINUED | OUTPATIENT
Start: 2019-10-24 | End: 2019-10-24 | Stop reason: HOSPADM

## 2019-10-24 RX ORDER — OXYCODONE HYDROCHLORIDE 5 MG/1
5-10 TABLET ORAL EVERY 4 HOURS PRN
Qty: 12 TABLET | Refills: 0 | Status: SHIPPED | OUTPATIENT
Start: 2019-10-24

## 2019-10-24 RX ORDER — ONDANSETRON 2 MG/ML
4 INJECTION INTRAMUSCULAR; INTRAVENOUS ONCE
Status: COMPLETED | OUTPATIENT
Start: 2019-10-24 | End: 2019-10-24

## 2019-10-24 RX ORDER — MEPERIDINE HYDROCHLORIDE 25 MG/ML
12.5 INJECTION INTRAMUSCULAR; INTRAVENOUS; SUBCUTANEOUS
Status: DISCONTINUED | OUTPATIENT
Start: 2019-10-24 | End: 2019-10-24 | Stop reason: HOSPADM

## 2019-10-24 RX ORDER — KETOROLAC TROMETHAMINE 15 MG/ML
15 INJECTION, SOLUTION INTRAMUSCULAR; INTRAVENOUS ONCE
Status: COMPLETED | OUTPATIENT
Start: 2019-10-24 | End: 2019-10-24

## 2019-10-24 RX ORDER — DEXAMETHASONE SODIUM PHOSPHATE 4 MG/ML
INJECTION, SOLUTION INTRA-ARTICULAR; INTRALESIONAL; INTRAMUSCULAR; INTRAVENOUS; SOFT TISSUE PRN
Status: DISCONTINUED | OUTPATIENT
Start: 2019-10-24 | End: 2019-10-24

## 2019-10-24 RX ORDER — NEOSTIGMINE METHYLSULFATE 1 MG/ML
VIAL (ML) INJECTION PRN
Status: DISCONTINUED | OUTPATIENT
Start: 2019-10-24 | End: 2019-10-24

## 2019-10-24 RX ORDER — IOPAMIDOL 755 MG/ML
73 INJECTION, SOLUTION INTRAVASCULAR ONCE
Status: COMPLETED | OUTPATIENT
Start: 2019-10-24 | End: 2019-10-24

## 2019-10-24 RX ADMIN — ONDANSETRON 4 MG: 2 INJECTION INTRAMUSCULAR; INTRAVENOUS at 08:29

## 2019-10-24 RX ADMIN — FENTANYL CITRATE 50 MCG: 50 INJECTION, SOLUTION INTRAMUSCULAR; INTRAVENOUS at 13:28

## 2019-10-24 RX ADMIN — DEXAMETHASONE SODIUM PHOSPHATE 4 MG: 4 INJECTION, SOLUTION INTRA-ARTICULAR; INTRALESIONAL; INTRAMUSCULAR; INTRAVENOUS; SOFT TISSUE at 13:38

## 2019-10-24 RX ADMIN — SODIUM CHLORIDE, POTASSIUM CHLORIDE, SODIUM LACTATE AND CALCIUM CHLORIDE: 600; 310; 30; 20 INJECTION, SOLUTION INTRAVENOUS at 14:04

## 2019-10-24 RX ADMIN — SODIUM CHLORIDE 62 ML: 9 INJECTION, SOLUTION INTRAVENOUS at 09:02

## 2019-10-24 RX ADMIN — LIDOCAINE HYDROCHLORIDE 100 MG: 20 INJECTION, SOLUTION INFILTRATION; PERINEURAL at 13:28

## 2019-10-24 RX ADMIN — ROCURONIUM BROMIDE 30 MG: 10 INJECTION INTRAVENOUS at 13:46

## 2019-10-24 RX ADMIN — MIDAZOLAM 2 MG: 1 INJECTION INTRAMUSCULAR; INTRAVENOUS at 13:24

## 2019-10-24 RX ADMIN — NEOSTIGMINE METHYLSULFATE 3 MG: 1 INJECTION, SOLUTION INTRAVENOUS at 14:01

## 2019-10-24 RX ADMIN — SODIUM CHLORIDE, POTASSIUM CHLORIDE, SODIUM LACTATE AND CALCIUM CHLORIDE: 600; 310; 30; 20 INJECTION, SOLUTION INTRAVENOUS at 13:11

## 2019-10-24 RX ADMIN — SODIUM CHLORIDE 1000 ML: 9 INJECTION, SOLUTION INTRAVENOUS at 08:24

## 2019-10-24 RX ADMIN — PROPOFOL 150 MG: 10 INJECTION, EMULSION INTRAVENOUS at 13:28

## 2019-10-24 RX ADMIN — IOPAMIDOL 73 ML: 755 INJECTION, SOLUTION INTRAVENOUS at 09:02

## 2019-10-24 RX ADMIN — DEXMEDETOMIDINE HYDROCHLORIDE 4 MCG: 100 INJECTION, SOLUTION INTRAVENOUS at 13:54

## 2019-10-24 RX ADMIN — ONDANSETRON 4 MG: 2 INJECTION INTRAMUSCULAR; INTRAVENOUS at 13:57

## 2019-10-24 RX ADMIN — ROCURONIUM BROMIDE 30 MG: 10 INJECTION INTRAVENOUS at 13:28

## 2019-10-24 RX ADMIN — GLYCOPYRROLATE 0.6 MG: 0.2 INJECTION, SOLUTION INTRAMUSCULAR; INTRAVENOUS at 14:01

## 2019-10-24 RX ADMIN — DEXMEDETOMIDINE HYDROCHLORIDE 8 MCG: 100 INJECTION, SOLUTION INTRAVENOUS at 13:51

## 2019-10-24 RX ADMIN — HYDROMORPHONE HYDROCHLORIDE 0.5 MG: 1 INJECTION, SOLUTION INTRAMUSCULAR; INTRAVENOUS; SUBCUTANEOUS at 15:39

## 2019-10-24 RX ADMIN — PROPOFOL 50 MG: 10 INJECTION, EMULSION INTRAVENOUS at 13:46

## 2019-10-24 RX ADMIN — PROPOFOL 200 MCG/KG/MIN: 10 INJECTION, EMULSION INTRAVENOUS at 13:28

## 2019-10-24 RX ADMIN — FENTANYL CITRATE 50 MCG: 50 INJECTION, SOLUTION INTRAMUSCULAR; INTRAVENOUS at 13:44

## 2019-10-24 RX ADMIN — DEXMEDETOMIDINE HYDROCHLORIDE 8 MCG: 100 INJECTION, SOLUTION INTRAVENOUS at 13:47

## 2019-10-24 RX ADMIN — KETOROLAC TROMETHAMINE 15 MG: 15 INJECTION, SOLUTION INTRAMUSCULAR; INTRAVENOUS at 09:46

## 2019-10-24 ASSESSMENT — ENCOUNTER SYMPTOMS
SHORTNESS OF BREATH: 0
VOMITING: 1
FEVER: 0
ABDOMINAL PAIN: 1
NAUSEA: 1
BACK PAIN: 0

## 2019-10-24 ASSESSMENT — LIFESTYLE VARIABLES: TOBACCO_USE: 0

## 2019-10-24 NOTE — PHARMACY-ADMISSION MEDICATION HISTORY
Pharmacy Medication History  Admission medication history interview status for the 10/24/2019  admission is complete. See EPIC admission navigator for prior to admission medications     Medication history sources: Patient - denies taking any Rx or OTC medications at home on a regular basis.   Medication history source reliability: Good  Medication reconciliation completed by provider prior to medication history? No    Time spent in this activity: 5 minutes

## 2019-10-24 NOTE — ANESTHESIA CARE TRANSFER NOTE
Patient: Jamee Demarco    Procedure(s):  DIAGNOSTIC LAPAROSCOPIC FOR  RIGHT OVARIAN TORSION; DETORSION OF RIGHT OVARY    Diagnosis: Ovarian torsion [N83.519]  Diagnosis Additional Information: No value filed.    Anesthesia Type:   General, ETT     Note:  Airway :Face Mask  Patient transferred to:PACU  Comments: Neuromuscular blockade reversed after TOF 4/4, spontaneous respirations, adequate tidal volumes, followed commands to voice, oropharynx suctioned with soft flexible catheter, extubated atraumatically, extubated with suction, airway patent after extubation.  Oxygen via facemask at 10 liters per minute to PACU. Oxygen tubing connected to wall O2 in PACU, SpO2, NiBP, and EKG monitors and alarms on and functioning, report on patient's clinical status given to PACU RN, RN questions answered. Handoff Report: Identifed the Patient, Identified the Reponsible Provider, Reviewed the pertinent medical history, Discussed the surgical course, Reviewed Intra-OP anesthesia mangement and issues during anesthesia, Set expectations for post-procedure period and Allowed opportunity for questions and acknowledgement of understanding      Vitals: (Last set prior to Anesthesia Care Transfer)    CRNA VITALS  10/24/2019 1341 - 10/24/2019 1420      10/24/2019             Pulse:  102    SpO2:  99 %    Resp Rate (observed):  (!) 5    Resp Rate (set):  10                Electronically Signed By: DULCE Carlton CRNA  October 24, 2019  2:20 PM

## 2019-10-24 NOTE — ANESTHESIA POSTPROCEDURE EVALUATION
Patient: Jamee Demarco    Procedure(s):  DIAGNOSTIC LAPAROSCOPIC FOR  RIGHT OVARIAN TORSION; DETORSION OF RIGHT OVARY    Diagnosis:Ovarian torsion [N83.519]  Diagnosis Additional Information: No value filed.    Anesthesia Type:  General, ETT    Note:  Anesthesia Post Evaluation    Patient location during evaluation: PACU  Patient participation: Able to fully participate in evaluation  Level of consciousness: awake and alert  Pain management: adequate  Airway patency: patent  Cardiovascular status: acceptable  Respiratory status: acceptable and unassisted  Hydration status: acceptable  PONV: none             Last vitals:  Vitals:    10/24/19 1413 10/24/19 1420 10/24/19 1425   BP: (!) 131/91 119/81    Pulse: 103 93    Resp: 20 20 18   Temp:   36.8  C (98.3  F)   SpO2: 100% 100% 100%         Electronically Signed By: Eduarda Win MD  October 24, 2019  2:33 PM

## 2019-10-24 NOTE — H&P
[facility]    History and Physical  Obstetrics and Gynecology     Date of Admission:  10/24/2019    Assessment & Plan   Jamee Demarco is a 43 year old female approximately 1 year s/p LAVH-BS who presents with severe RLQ pain with minimal improvement with IV pain meds and imaging concerning for right ovarian torsion. There is also some free fluid, so potentially this represents hemorrhagic cyst. Hgb normal at 14.0.     Discussed likely diagnosis with patient. Discussed etiology of pain in this scenario. Discussed with cessation of blood flow to the ovary, there is risk of loss of the ovary. Discussed that although she no longer has a uterus, her ovaries are still providing hormones that help with overall health including bone and heart health. Discussed recommendation for at least diagnostic laparoscopy, detorsion of right ovary if applicable, and possible right oophorectomy if ovary does not appear salvageable. Discussed potential risk of recurrence. Discussed potential for identifying other potential etiology for pain and other procedures performed as indicated. Discussed risks of surgery including bleeding, infection, injury to surrounding structures, need for reoperation.   Called to surgical scheduling - they will be calling me back with an OR time.     Ina Fulton MD      Chief Complaint   Severe RLQ pain    History is obtained from the patient    History of Present Illness   Jamee Demarco is a 43 year old female who presents with severe RLQ pain. It woke her from sleep at 0200 and has been present since. It caused her to feel lightheaded and nauseated. She called her PCP who suspected appendicitis and advised presentation to ED. She received IV pain medication recently which she reports minimally improved the pain. No constipation, diarrhea, dysuria, hematuria, vaginal discharge, VB. Hasn't eaten anything today.    She had LAVH-BS about one year ago and has had no issues  otherwise since then.     Prior to Admission Medications   Prior to Admission Medications   Prescriptions Last Dose Informant Patient Reported? Taking?   acetaminophen (TYLENOL) 325 MG tablet   No No   Sig: Take 2 tablets (650 mg) by mouth every 4 hours as needed for other (mild pain)   ibuprofen (ADVIL/MOTRIN) 600 MG tablet   No No   Sig: Take 1 tablet (600 mg) by mouth every 6 hours as needed for pain (mild)   oxyCODONE IR (ROXICODONE) 5 MG tablet   No No   Sig: Take 1-2 tablets (5-10 mg) by mouth every 3 hours as needed for moderate to severe pain (Moderate to Severe)   simethicone (MYLICON) 80 MG chewable tablet   No No   Sig: Take 1 tablet (80 mg) by mouth every 6 hours as needed for flatulence or cramping      Facility-Administered Medications: None     Allergies   No Known Allergies    ROS negative aside from that noted in HPI    Physical Exam   Temp: 98  F (36.7  C) Temp src: Oral BP: 133/85 Pulse: 98 Heart Rate: 98 Resp: 18 SpO2: 98 % O2 Device: None (Room air)    Vital Signs with Ranges  Temp:  [98  F (36.7  C)] 98  F (36.7  C)  Pulse:  [98] 98  Heart Rate:  [98] 98  Resp:  [18-20] 18  BP: (133-154)/(85-90) 133/85  SpO2:  [98 %-99 %] 98 %  145 lbs 0 oz    # Pain Assessment:  Current Pain Score 10/24/2019   Patient currently in pain? -   Pain score (0-10) 2   Pain location -   Pain descriptors -     Gen: lying in bed in NAD  Abd: soft, ttp with light touch to RLQ, no ttp elsewhere  : no VB    Ina Fulton MD

## 2019-10-24 NOTE — ED PROVIDER NOTES
History     Chief Complaint:  Abdominal Pain    HPI   Jamee Demarco is a 43 year old female who presents with abdominal pain. The patient states that she was awoken from sleep this morning at 0200 with right sided mainly lower quadrant abdominal pain. She states the pain seems to radiate down and wrap around to her lower back. The patient notes associated nausea and vomiting though she is less nauseated right now but slightly shaky. The patient denies chest pain, shortness of breath, back pain, or fevers. The patient states she had a few sips of water this morning but no food since yesterday.     Allergies:  No Known Allergies     Medications:    No daily medication.     Past Medical History:    Past medical history reviewed. No history of appendicitis, kidney stones, or small bowel obstruction.     Past Surgical History:    [place] teeth extraction  Endometrial ablation  Tubal ligation  Pins in bilateral hips as child  Laparoscopic assisted total hysterectomy  Laparoscopic salpingectomy     Family History:   History reviewed. No pertinent family history.    Social History:  Smoking Status: former smoker  Smokeless Tobacco: Never Used  Alcohol Use: Yes  Drug Use: No  PCP: Karin Cárdenas  Marital Status:        Review of Systems   Constitutional: Negative for fever.   Respiratory: Negative for shortness of breath.    Cardiovascular: Negative for chest pain.   Gastrointestinal: Positive for abdominal pain, nausea and vomiting.   Musculoskeletal: Negative for back pain.   All other systems reviewed and are negative.    Physical Exam     Patient Vitals for the past 24 hrs:   BP Temp Temp src Pulse Heart Rate Resp SpO2 Weight   10/24/19 1615 124/67 98.8  F (37.1  C) Oral -- 72 16 97 % --   10/24/19 1600 -- -- -- -- 85 17 96 % --   10/24/19 1550 107/69 -- -- 64 64 14 97 % --   10/24/19 1540 111/72 -- -- 72 72 14 94 % --   10/24/19 1530 115/75 -- -- 71 71 16 96 % --   10/24/19 1520 110/75 -- -- 66 66 18  98 % --   10/24/19 1510 108/74 -- -- 66 70 16 98 % --   10/24/19 1500 113/73 98.8  F (37.1  C) Temporal 61 59 16 98 % --   10/24/19 1450 111/73 -- -- 62 62 16 100 % --   10/24/19 1445 119/75 -- -- -- 71 16 100 % --   10/24/19 1440 119/75 -- -- 72 73 16 98 % --   10/24/19 1430 113/73 -- -- 76 78 20 100 % --   10/24/19 1425 -- 98.3  F (36.8  C) Temporal -- 89 18 100 % --   10/24/19 1420 119/81 -- -- 93 96 20 100 % --   10/24/19 1413 (!) 131/91 -- -- 103 -- 20 100 % --   10/24/19 1200 (!) 131/96 -- -- 80 -- -- 98 % --   10/24/19 1100 127/82 -- -- 73 -- -- 99 % --   10/24/19 1045 133/85 -- -- -- -- 18 98 % --   10/24/19 0801 (!) 154/90 98  F (36.7  C) Oral 98 98 20 99 % 65.8 kg (145 lb)     Physical Exam  General: Alert, appears well-developed and well-nourished. Cooperative.     In moderate distress  HEENT:  Head:  Atraumatic  Ears:  External ears are normal  Mouth/Throat:  Oropharynx is without erythema or exudate and mucous membranes are dry.   Eyes:   Conjunctivae normal and EOM are normal. No scleral icterus.  CV:  Normal rate, regular rhythm, normal heart sounds and radial pulses are 2+ and symmetric.  No murmur.  Resp:  Breath sounds are clear bilaterally    Non-labored, no retractions or accessory muscle use  GI:  Abdomen is soft, no distension, Right lower quadrant tenderness with guarding. No rebound.  No CVA tenderness bilaterally  MS:  Normal range of motion. No edema.    Normal strength in all 4 extremities.     Back atraumatic.    No midline cervical, thoracic, or lumbar tenderness  Skin:  Warm and dry.  No rash or lesions noted.  Neuro:   Alert. Normal strength.  GCS: 15    Psych: Normal mood and affect.    Emergency Department Course   Imaging:  Radiology findings were communicated with the patient who voiced understanding of the findings.    CT Abdomen Pelvis  1. Postsurgical changes of hysterectomy with a small amount of   free fluid in the pelvis, could be phthisiological.   Mild prominent right ovary  as compared to the left, can be   further evaluated with pelvis ultrasound in clinically warranted.  2. Colonic diverticulosis without CT evidence of acute diverticulitis.   Reading per radiology     US Pelvis Cmplt w Transvag and Doppler LmtPel Duplex Limited  Mildly enlarged right ovary with evidence of arterial but  intermittent venous flow as compared to the left along with small  amount of free fluid in the right adnexa, finding could represent an  early torsion versus torsion/detorsion.   Reading per radiology     Laboratory:  Laboratory findings were communicated with the patient who voiced understanding of the findings.    CBC: WBC 7.2, HGB 14.0,   CMP: glucose 126(H) o/w WNL (Creatinine 0.53)  Lipase: 95  ABO RH type and screen: pending     UA with microscopic: urineketon 10(H), protein albumin 10(H), squamous epithelial 6(H), mucous present o/w WNL  UPT: negative     Interventions:  0824 NS 1000 mL IV  0829 Zofran 4 mg IV  0946 Toradol 15 mg IV    Emergency Department Course:  Nursing notes and vitals reviewed.    0804 I performed an exam of the patient as documented above.     IV was inserted and blood was drawn for laboratory testing, results above.    The patient provided a urine sample here in the emergency department. This was sent for laboratory testing, findings above.    The patient was sent for a CT Abdomen Pelvis while in the emergency department, results above.     0933 I returned to update the patient. We discussed preforming further diagnostic imaging with a pelvic ultrasound and she agreed.     The patient was sent for a US Pelvis Cmplt w Transvag and Doppler LmtPel Duplex Limited while in the emergency department, results above.     1105 I returned to update the patient.     1110 I spoke with Dr. Ina Fulton of the OB GYN service from [facility] regarding patient's presentation, findings, and plan of care. She will come in to see the patient.     1158 I spoke with Dr. Ina Fulton  of the OB GYN service from [facility] regarding patient's plan of care. She will take the patient to the OR today.     Findings and plan explained to the Patient who consents to admission. Discussed the patient with Dr. Fulton, who will admit the OR for further monitoring, evaluation, and treatment.    Impression & Plan    Medical Decision Making:  Patient is a 43-year-old female status post hysterectomy and bilateral salpingectomy who presents with right lower quadrant pain.  CT scan shows no evidence of acute appendicitis.  In addition there is no signs of bowel obstruction or other intra-abdominal abscess.  There was a enlarged right ovary and adnexal inflammation.  We did send for ultrasound out of concern for potential intermittent torsion, and the right ovary was deemed mildly enlarged with evidence of arterial but intermittent venous flow.  This is concerning for potential early torsion versus intermittent torsion.  I spoke with OBGYN,  Dr. Fulton of [facility] who evaluated the patient in the emergency department and recommended explorative laparoscopy out of concern for right ovarian torsion.  Patient's blood work and urinalysis unremarkable.  Low concerns for infectious etiologies or electrolyte abnormalities.  CBC normal.  Sent to PACU for surgical intervention and definitive treatment.    Diagnosis:    ICD-10-CM    1. Ovarian torsion N83.519 ABO/Rh type and screen     Laparoscopic oophorectomy     Laparoscopic oophorectomy     ABO/Rh type and screen     May discharge when: stable and discharge criteria met     Vital Signs     Activity: Up ad imani     Discontinue IV and saline lock     Patient Care order - PO fluids and diet     Glucose monitor nursing POCT     Discharge planning     Educate Patient if Urinary Retention     Discharge planning     Discharge planning     Discharge planning     Discharge planning     Discharge planning     NO Change: Discharge Prescription Plan reviewed post procedure      NO Rho (D) immune globulin (RhoGam) needed - NOT obstetric patient     NO Packing     oxyCODONE (ROXICODONE) tablet 5 mg     ibuprofen (ADVIL/MOTRIN) 600 MG tablet     acetaminophen (TYLENOL) 325 MG tablet     oxyCODONE (ROXICODONE) 5 MG tablet     May discharge when: stable and discharge criteria met     Vital Signs     Activity: Up ad imani     Discontinue IV and saline lock     Patient Care order - PO fluids and diet     Glucose monitor nursing POCT     Discharge planning     Discharge planning     Discharge planning     NO Change: Discharge Prescription Plan reviewed post procedure     NO Packing     DISCONTINUED: PRE OP antibiotics NOT needed for this surgical procedure     CANCELED: Obtain affirmation of informed consent     CANCELED: Void on call to OR     CANCELED: Shampoo hair PRIOR to surgery     CANCELED: Shower/Bathe PRIOR to surgery     CANCELED: Cleanse skin for procedure     CANCELED: Notify Provider - Anticoagulants and Antiplatelets     CANCELED: NPO per Anesthesia Guidelines for Procedure/Surgery Except for: Meds     CANCELED: Apply Pneumatic Compression Device (PCD)     CANCELED: Pneumatic Compression Device (PCD) (Equipment)     CANCELED: Obtain affirmation of informed consent     CANCELED: Void on call to OR     CANCELED: Shampoo hair PRIOR to surgery     CANCELED: Shower/Bathe PRIOR to surgery     CANCELED: Cleanse skin for procedure     CANCELED: Notify Provider - Anticoagulants and Antiplatelets     CANCELED: NPO per Anesthesia Guidelines for Procedure/Surgery Except for: Meds     CANCELED: Apply Pneumatic Compression Device (PCD)     CANCELED: Pneumatic Compression Device (PCD) (Equipment)   2. RLQ abdominal pain R10.31        Disposition:   The patient is going to the OR with Dr. Donaldo Josue Disclosure:  Gilma KENT, am serving as a scribe at 8:05 AM on 10/24/2019 to document services personally performed by Nate Woods MD based on my observations and the provider's  statements to me.    EMERGENCY DEPARTMENT       Nate Woods MD  10/24/19 [id number]

## 2019-10-24 NOTE — ANESTHESIA PREPROCEDURE EVALUATION
Anesthesia Pre-Procedure Evaluation    Patient: Jamee Demarco   MRN: [id number] : 1975          Preoperative Diagnosis: Ovarian torsion [N83.519]    Procedure(s):  DIAGNOSTIC LAPAROSCOPIC FOR  RIGHT OVARIAN TORSION, POSSIBLE LAPAROSCOPIC OOPHORECTOMY.    Past Medical History:   Diagnosis Date     PONV (postoperative nausea and vomiting)      Past Surgical History:   Procedure Laterality Date     CYSTOSCOPY N/A 10/12/2018    Procedure: CYSTOSCOPY;;  Surgeon: Ina Fulton MD;  Location: [facility]     GYN SURGERY      T.L., ablation     LAPAROSCOPIC ASSISTED HYSTERECTOMY VAGINAL N/A 10/12/2018    Procedure: LAPAROSCOPIC ASSISTED HYSTERECTOMY VAGINAL;  LAPAROSCOPIC ASSISTED VAGINAL HYSTERECTOMY, BILATERAL SALPINGECTOMY,  CYSTOSCOPY;  Surgeon: Ina Fulton MD;  Location: [facility]     LAPAROSCOPIC SALPINGECTOMY Bilateral 10/12/2018    Procedure: LAPAROSCOPIC SALPINGECTOMY;;  Surgeon: Ina Fulton MD;  Location: [facility]     ORTHOPEDIC SURGERY      hips as child       Anesthesia Evaluation     . Pt has had prior anesthetic.     History of anesthetic complications   - PONV        ROS/MED HX    ENT/Pulmonary:      (-) tobacco use, asthma and sleep apnea   Neurologic:  - neg neurologic ROS     Cardiovascular:  - neg cardiovascular ROS      (-) hypertension   METS/Exercise Tolerance:     Hematologic:         Musculoskeletal:         GI/Hepatic:  - neg GI/hepatic ROS      (-) GERD   Renal/Genitourinary:  - ROS Renal section negative       Endo:  - neg endo ROS       Psychiatric:         Infectious Disease:         Malignancy:         Other:                          Physical Exam  Normal systems: cardiovascular, pulmonary and dental    Airway   Mallampati: II  TM distance: >3 FB  Neck ROM: full    Dental     Cardiovascular       Pulmonary             Lab Results   Component Value Date    WBC 7.2 10/24/2019    HGB 14.0 10/24/2019    HCT 40.6 10/24/2019     10/24/2019     10/24/2019     "POTASSIUM 3.5 10/24/2019    CHLORIDE 107 10/24/2019    CO2 25 10/24/2019    BUN 8 10/24/2019    CR 0.53 10/24/2019     (H) 10/24/2019    BELLE 8.9 10/24/2019    ALBUMIN 3.9 10/24/2019    PROTTOTAL 7.3 10/24/2019    ALT 15 10/24/2019    AST 13 10/24/2019    ALKPHOS 46 10/24/2019    BILITOTAL 0.5 10/24/2019    LIPASE 95 10/24/2019    HCG Negative 10/24/2019       Preop Vitals  BP Readings from Last 3 Encounters:   10/24/19 (!) 131/96   10/12/18 113/72    Pulse Readings from Last 3 Encounters:   10/24/19 80      Resp Readings from Last 3 Encounters:   10/24/19 18   10/12/18 14    SpO2 Readings from Last 3 Encounters:   10/24/19 98%   10/12/18 99%      Temp Readings from Last 1 Encounters:   10/24/19 36.7  C (98  F) (Oral)    Ht Readings from Last 1 Encounters:   10/12/18 1.676 m (5' 6\")      Wt Readings from Last 1 Encounters:   10/24/19 65.8 kg (145 lb)    Estimated body mass index is 23.4 kg/m  as calculated from the following:    Height as of 10/12/18: 1.676 m (5' 6\").    Weight as of this encounter: 65.8 kg (145 lb).       Anesthesia Plan      History & Physical Review  History and physical reviewed and following examination; no interval change.    ASA Status:  1 .    NPO Status:  > 8 hours    Plan for General and ETT with Intravenous and Propofol induction. Maintenance will be TIVA.    PONV prophylaxis:  Ondansetron (or other 5HT-3) and Dexamethasone or Solumedrol       Postoperative Care  Postoperative pain management:  Multi-modal analgesia.      Consents  Anesthetic plan, risks, benefits and alternatives discussed with:  Patient..                 Eduarda Win MD  "

## 2019-10-24 NOTE — OR NURSING
1450hr - [facility] rounding on pt - pt sleeping on/off; VS & surgical sites are stable.  Okay for pt to transfer to floor per [facility].

## 2019-10-24 NOTE — ED NOTES
Bed: ED20  Expected date: 10/24/19  Expected time: 7:59 AM  Means of arrival:   Comments:  Triage

## 2019-10-24 NOTE — OP NOTE
Gyn Operative Note    Jamee Demarco  October 24, 2019      PREOPERATIVE DIAGNOSIS:   Severe right lower quadrant pain, suspected right ovarian torsion    POSTOPERATIVE DIAGNOSIS: same, confirmed right ovarian torsion    PROCEDURE: diagnostic laparoscopy, right ovarian detorsion    ANESTHESIA:  General endotracheal    SURGEON:  Ina Fulton MD    ASSISTANT: Yanely Antunez CST    EBL: 2 mL    IVF: 900 mL    UOP: patient urinated just prior to case\    FINDINGS: absent uterus, cervix, and fallopian tubes; right ovary torsed on IP with two turns, purple/dusky - began to normalize in color after detorsion; normal-appearing left ovary    SPECIMENS: none    COMPLICATIONS: none    INDICATION AND CONSENT:   This is a 43 year old with h/o LAVH-BS who presented with severe RLQ pain. Imaging was suggestive of at least intermittent right ovarian torsion. Surgical evaluation and management was recommended to avoid loss of the ovary and to manage pain. Discussed risks of surgery including bleeding, infection, injury to surrounding structures, need for reoperation, need for blood transfusion. She expressed understanding and consented to proceed.     PROCEDURE:      The patient was brought to the operating room with IV fluids running. General anesthesia was induced. She was prepped and draped in the dorsal lithotomy position. A timeout was performed to identify the patient and procedure.      A lap sponge on a ring forceps was placed vaginally.    Attention was directed to the patients abdomen. A 5 mm incision was created at the base of the umbilicus with a scalpel. The scope was inserted into a 5 mm Optiview trocar which was placed at the site of the incision so that the abdomen could be entered under direct visualization. The CO2 gas was attached to the trocar and the abdominal pressure was insufflated to 15 mm Hg.      The abdomen was surveyed with the findings noted above. The patient was placed in Trendelenburg position.      100 mL of fluid was noted in the pelvis, and this was suctioned away. The suction device was used as a blunt probe to destorse the ovary on the IP. The left ovary was normal. The right ovary was observed for 5 minutes and was normalizing in color. It was situated into the cul-de-sac and covered with bowel as she was taken out of Trendelenburg position.     The lower quadrant port was removed under direct visualization. The scope was removed. CO2 gas was allowed to escape from the abdomen. The skin was closed with 4-0 Monocryl in a subcuticular fashion. The lap sponge on ring forceps was removed from the vagina.     The patient tolerated the procedure well. All counts were correct. The patient was brought to the recovery room in stable condition.    Ina Fulton MD  10/24/2019 2201

## 2019-10-24 NOTE — PROGRESS NOTES
Patient arrived from PACU via cart accompanied by JO.  Patient was transferred to her bed and oriented to room and controls.  Patient currently asymptomatic.  Surgical wound sites are open to air and dry.  Patient informed about calling dietary for her supper.

## 2019-10-25 NOTE — DISCHARGE INSTRUCTIONS
Please call the office if you experience  - vaginal bleeding  - temperature greater than 100.4  - inability to tolerate food or fluids  - pain uncontrolled with oral medications  - drainage or bleeding of incisions    Please make an appointment to follow-up in the office in 2 weeks.            DISCHARGE INSTRUCTIONS  FOLLOWING LAPAROSCOPY    ACTIVITY:  You may resume normal activities including lifting as your abdominal discomfort disappears.  You may return to work after two days if you feel well enough.  Possible abdominal and shoulder discomfort due to gas remaining in the abdomen should be gone within 48 hours.  It is permissibleto climb stairs. Showers are perfectly acceptable.  You may drive a car after 24 hours as long as you are not taking narcotic pain pills.    CHECK-UP:  You should be seen one month after discharge unless home instruction sheet states otherwise and please phone the office the day after discharge and schedule an appointment with your physician.    VAGINAL DISCHARGE:  You may have some vaginal bleeding or discharge for about a week after discharge.  You should avoid douches, tampons, and intercourse for the first week.    TEMPERATURE:  If you develop temperature levels to over 100.4  your physician should be called imlmediately.      STITCHES:  There is usually a stitch under the skin incisions which will dissolve and does not need to be removed.  The Band-Aids may be removed at any time.    DIET:  [place] or light diet is advisable the day of surgery.  If nausea persists, continue this diet.  If severe, call.    Please call the office for increasingly severe abdominal pain or vaginal bleeding in excess of one pad per hour.  This will rarely be a problem.[email or web address]    [facility]  [address], [address]  [place], [place] [address]  [phone]        Same Day Surgery Discharge Instructions for  Sedation and General Anesthesia       It's not unusual to feel dizzy,  light-headed or faint for up to 24 hours after surgery or while taking pain medication.  If you have these symptoms: sit for a few minutes before standing and have someone assist you when you get up to walk or use the bathroom.      You should rest and relax for the next 24 hours. We recommend you make arrangements to have an adult stay with you for at least 24 hours after your discharge.  Avoid hazardous and strenuous activity.      DO NOT DRIVE any vehicle or operate mechanical equipment for 24 hours following the end of your surgery.  Even though you may feel normal, your reactions may be affected by the medication you have received.      Do not drink alcoholic beverages for 24 hours following surgery.       Slowly progress to your regular diet as you feel able. It's not unusual to feel nauseated and/or vomit after receiving anesthesia.  If you develop these symptoms, drink clear liquids (apple juice, ginger ale, broth, 7-up, etc. ) until you feel better.  If your nausea and vomiting persists for 24 hours, please notify your surgeon.        All narcotic pain medications, along with inactivity and anesthesia, can cause constipation. Drinking plenty of liquids and increasing fiber intake will help.      For any questions of a medical nature, call your surgeon.      Do not make important decisions for 24 hours.      If you had general anesthesia, you may have a sore throat for a couple of days related to the breathing tube used during surgery.  You may use Cepacol lozenges to help with this discomfort.  If it worsens or if you develop a fever, contact your surgeon.       If you feel your pain is not well managed with the pain medications prescribed by your surgeon, please contact your surgeon's office to let them know so they can address your concerns.               **If you have questions or concerns about your procedure,   call Dr Ina Fulton at  [phone]**

## 2019-10-25 NOTE — PROGRESS NOTES
Discharge instructions reviewed with patient  Jamee Demarco and care taker (Calvin- ). Patient and caretaker verbalized understanding discharge instructions, all questions answered, has no further question, and no knowledge deficit noted. AVS was given to patient/family. Prescribed medications ( including narcotic medication Oxycodone) was/were given to the care take with an intact seal. Patient's belongings were returned to the patient and care taker. Patient was discharged in stable condition. Patient was transferred to a wheel chair and was accompanied by RN to the hospital entrance door to be discharged to patient's home.

## 2024-05-16 ENCOUNTER — HOSPITAL ENCOUNTER (EMERGENCY)
Facility: CLINIC | Age: 49
Discharge: HOME | End: 2024-05-16
Attending: EMERGENCY MEDICINE | Admitting: EMERGENCY MEDICINE
Payer: COMMERCIAL

## 2024-05-16 ENCOUNTER — APPOINTMENT (OUTPATIENT)
Dept: GENERAL RADIOLOGY | Facility: CLINIC | Age: 49
End: 2024-05-16
Attending: EMERGENCY MEDICINE
Payer: COMMERCIAL

## 2024-05-16 VITALS
HEIGHT: 66 IN | BODY MASS INDEX: 22.34 KG/M2 | TEMPERATURE: 98.4 F | HEART RATE: 75 BPM | OXYGEN SATURATION: 99 % | DIASTOLIC BLOOD PRESSURE: 99 MMHG | WEIGHT: 139 LBS | SYSTOLIC BLOOD PRESSURE: 159 MMHG | RESPIRATION RATE: 14 BRPM

## 2024-05-16 DIAGNOSIS — R00.2 PALPITATIONS: ICD-10-CM

## 2024-05-16 DIAGNOSIS — R55 NEAR SYNCOPE: ICD-10-CM

## 2024-05-16 LAB
ALBUMIN UR-MCNC: NEGATIVE MG/DL
ANION GAP SERPL CALCULATED.3IONS-SCNC: 14 MMOL/L (ref 7–15)
APPEARANCE UR: CLEAR
ATRIAL RATE - MUSE: 84 BPM
BACTERIA #/AREA URNS HPF: ABNORMAL /HPF
BASOPHILS # BLD AUTO: 0.1 10E3/UL (ref 0–0.2)
BASOPHILS NFR BLD AUTO: 1 %
BILIRUB UR QL STRIP: NEGATIVE
BUN SERPL-MCNC: 11.2 MG/DL (ref 6–20)
CALCIUM SERPL-MCNC: 8.9 MG/DL (ref 8.6–10)
CHLORIDE SERPL-SCNC: 103 MMOL/L (ref 98–107)
COLOR UR AUTO: ABNORMAL
CREAT SERPL-MCNC: 0.6 MG/DL (ref 0.51–0.95)
DEPRECATED HCO3 PLAS-SCNC: 22 MMOL/L (ref 22–29)
DIASTOLIC BLOOD PRESSURE - MUSE: NORMAL MMHG
EGFRCR SERPLBLD CKD-EPI 2021: >90 ML/MIN/1.73M2
EOSINOPHIL # BLD AUTO: 0 10E3/UL (ref 0–0.7)
EOSINOPHIL NFR BLD AUTO: 1 %
ERYTHROCYTE [DISTWIDTH] IN BLOOD BY AUTOMATED COUNT: 11.9 % (ref 10–15)
GLUCOSE SERPL-MCNC: 102 MG/DL (ref 70–99)
GLUCOSE UR STRIP-MCNC: NEGATIVE MG/DL
HCT VFR BLD AUTO: 40.7 % (ref 35–47)
HGB BLD-MCNC: 14.1 G/DL (ref 11.7–15.7)
HGB UR QL STRIP: NEGATIVE
HOLD SPECIMEN: NORMAL
HOLD SPECIMEN: NORMAL
IMM GRANULOCYTES # BLD: 0 10E3/UL
IMM GRANULOCYTES NFR BLD: 0 %
INTERPRETATION ECG - MUSE: NORMAL
KETONES UR STRIP-MCNC: 20 MG/DL
LEUKOCYTE ESTERASE UR QL STRIP: NEGATIVE
LYMPHOCYTES # BLD AUTO: 1.1 10E3/UL (ref 0.8–5.3)
LYMPHOCYTES NFR BLD AUTO: 22 %
MCH RBC QN AUTO: 31.6 PG (ref 26.5–33)
MCHC RBC AUTO-ENTMCNC: 34.6 G/DL (ref 31.5–36.5)
MCV RBC AUTO: 91 FL (ref 78–100)
MONOCYTES # BLD AUTO: 0.3 10E3/UL (ref 0–1.3)
MONOCYTES NFR BLD AUTO: 5 %
NEUTROPHILS # BLD AUTO: 3.7 10E3/UL (ref 1.6–8.3)
NEUTROPHILS NFR BLD AUTO: 71 %
NITRATE UR QL: NEGATIVE
NRBC # BLD AUTO: 0 10E3/UL
NRBC BLD AUTO-RTO: 0 /100
P AXIS - MUSE: 78 DEGREES
PH UR STRIP: 5.5 [PH] (ref 5–7)
PLATELET # BLD AUTO: 264 10E3/UL (ref 150–450)
POTASSIUM SERPL-SCNC: 3.7 MMOL/L (ref 3.4–5.3)
PR INTERVAL - MUSE: 132 MS
QRS DURATION - MUSE: 78 MS
QT - MUSE: 372 MS
QTC - MUSE: 439 MS
R AXIS - MUSE: -10 DEGREES
RBC # BLD AUTO: 4.46 10E6/UL (ref 3.8–5.2)
RBC URINE: <1 /HPF
SODIUM SERPL-SCNC: 139 MMOL/L (ref 135–145)
SP GR UR STRIP: 1.01 (ref 1–1.03)
SQUAMOUS EPITHELIAL: 2 /HPF
SYSTOLIC BLOOD PRESSURE - MUSE: NORMAL MMHG
T AXIS - MUSE: 29 DEGREES
TROPONIN T SERPL HS-MCNC: <6 NG/L
UROBILINOGEN UR STRIP-MCNC: NORMAL MG/DL
VENTRICULAR RATE- MUSE: 84 BPM
WBC # BLD AUTO: 5.2 10E3/UL (ref 4–11)
WBC URINE: 1 /HPF

## 2024-05-16 PROCEDURE — 85025 COMPLETE CBC W/AUTO DIFF WBC: CPT | Performed by: EMERGENCY MEDICINE

## 2024-05-16 PROCEDURE — 99285 EMERGENCY DEPT VISIT HI MDM: CPT | Mod: 25

## 2024-05-16 PROCEDURE — 81001 URINALYSIS AUTO W/SCOPE: CPT | Performed by: EMERGENCY MEDICINE

## 2024-05-16 PROCEDURE — 96360 HYDRATION IV INFUSION INIT: CPT

## 2024-05-16 PROCEDURE — 80048 BASIC METABOLIC PNL TOTAL CA: CPT | Performed by: EMERGENCY MEDICINE

## 2024-05-16 PROCEDURE — 36415 COLL VENOUS BLD VENIPUNCTURE: CPT | Performed by: EMERGENCY MEDICINE

## 2024-05-16 PROCEDURE — 258N000003 HC RX IP 258 OP 636: Performed by: EMERGENCY MEDICINE

## 2024-05-16 PROCEDURE — 71046 X-RAY EXAM CHEST 2 VIEWS: CPT

## 2024-05-16 PROCEDURE — 93005 ELECTROCARDIOGRAM TRACING: CPT

## 2024-05-16 PROCEDURE — 84484 ASSAY OF TROPONIN QUANT: CPT | Performed by: EMERGENCY MEDICINE

## 2024-05-16 RX ADMIN — SODIUM CHLORIDE 1000 ML: 9 INJECTION, SOLUTION INTRAVENOUS at 09:20

## 2024-05-16 ASSESSMENT — ACTIVITIES OF DAILY LIVING (ADL)
ADLS_ACUITY_SCORE: 35
ADLS_ACUITY_SCORE: 35

## 2024-05-16 ASSESSMENT — COLUMBIA-SUICIDE SEVERITY RATING SCALE - C-SSRS
6. HAVE YOU EVER DONE ANYTHING, STARTED TO DO ANYTHING, OR PREPARED TO DO ANYTHING TO END YOUR LIFE?: NO
2. HAVE YOU ACTUALLY HAD ANY THOUGHTS OF KILLING YOURSELF IN THE PAST MONTH?: NO
1. IN THE PAST MONTH, HAVE YOU WISHED YOU WERE DEAD OR WISHED YOU COULD GO TO SLEEP AND NOT WAKE UP?: NO

## 2024-05-16 NOTE — DISCHARGE INSTRUCTIONS
As we discussed, no obvious cause of your symptoms was found here today.  Your labs are reassuring, does not like a heart attack, and EKG your heart not show any electrical abnormalities.  Please come back to the ER immediately with any worsening symptoms you have, any worsening palpitations, or if you feel that you are about to faint again or with any new symptoms.  Please do follow with your regular doctor in a week ahead, and come back with any other concerns

## 2024-05-16 NOTE — ED PROVIDER NOTES
"  History     Chief Complaint:  Palpitations and Headache       HPI   Jamee Demarco is a 48 year old female who presents with palpitations and \"waves of wooziness that have been an issue for her for the last several days.  She states that she has had this issue before in the distant past, but it resolved very quickly and she never had to seek care for it.  She states that she gets these episodes of lightheadedness and wooziness that last several seconds and have been happening multiple times a day for the last 3 days.  Also notes that she feels like she has an irregular heartbeat and at times it feels like it is going very very fast, and at other times it feels like it is irregular, she is not entirely sure which is more prevalent.  Denies any new medications, denies any fever or cough or cold symptoms.  Has lost a lot of weight over the last year intentionally, but no unintentional weight loss, and no night sweats apart from what she considers to be normal for her perimenopause.  Specifically denies any sensation of movement of vertigo or room spinning    Denies a headache, but does state that she gets \"waves of pressure\" that come into her head and dissipate quickly along with the \"wooziness\"    Denies any actual fainting, denies any pain when she urinates, no chest pain or abdominal pain.      Independent Historian:   Yes male partner is at the bedside and confirms the above history.    Review of External Notes: Yes I reviewed the patient's last office visit from 22 May of last year the patient was seen by her family practice doctor for impetigo.      Allergies:  No Known Allergies     Medications:    acetaminophen (TYLENOL) 325 MG tablet  ibuprofen (ADVIL/MOTRIN) 600 MG tablet  oxyCODONE (ROXICODONE) 5 MG tablet        Past Medical History:    Past Medical History:   Diagnosis Date    PONV (postoperative nausea and vomiting)        Past Surgical History:    Past Surgical History:   Procedure Laterality " "Date    CYSTOSCOPY N/A 10/12/2018    Procedure: CYSTOSCOPY;;  Surgeon: Ina Fulton MD;  Location: [facility]    GYN SURGERY      T.L., ablation    LAPAROSCOPIC ASSISTED HYSTERECTOMY VAGINAL N/A 10/12/2018    Procedure: LAPAROSCOPIC ASSISTED HYSTERECTOMY VAGINAL;  LAPAROSCOPIC ASSISTED VAGINAL HYSTERECTOMY, BILATERAL SALPINGECTOMY,  CYSTOSCOPY;  Surgeon: Ina Fulton MD;  Location: [facility]    LAPAROSCOPIC OOPHORECTOMY Right 10/24/2019    Procedure: DIAGNOSTIC LAPAROSCOPIC FOR  RIGHT OVARIAN TORSION; DETORSION OF RIGHT OVARY;  Surgeon: Ina Fulton MD;  Location:  OR    LAPAROSCOPIC SALPINGECTOMY Bilateral 10/12/2018    Procedure: LAPAROSCOPIC SALPINGECTOMY;;  Surgeon: Ina Fulton MD;  Location: [facility]    ORTHOPEDIC SURGERY      hips as child        Family History:    family history is not on file.    Social History:   reports that she has quit smoking. She has never used smokeless tobacco. She reports current alcohol use. She reports that she does not use drugs.  PCP: Hanna Ryder     Physical Exam   Patient Vitals for the past 24 hrs:   BP Temp Temp src Pulse Resp SpO2 Height Weight   05/16/24 0835 (!) 159/99 98.4  F (36.9  C) Temporal 88 16 100 % 1.676 m (5' 6\") 63 kg (139 lb)        Physical Exam  Vitals: reviewed by me  General: Pt seen on [facility], pleasant, cooperative, and alert to conversation  Eyes: Tracking well, clear conjunctiva BL  ENT: MMM, midline trachea.   Lungs: No tachypnea, no accessory muscle use. No respiratory distress.   CV: Rate as above  MSK: no joint effusion.  No evidence of trauma  Skin: No rash  Neuro: Clear speech and no facial droop.  Psych: Not RIS, no e/o AH/VH      Emergency Department Course     ECG results from 05/16/24   EKG 12-lead, tracing only     Value    Systolic Blood Pressure     Diastolic Blood Pressure     Ventricular Rate 84    Atrial Rate 84    NM Interval 132    QRS Duration 78        QTc 439    P Axis 78    R AXIS " -10    T Axis 29    Interpretation ECG      Sinus rhythm  Nonspecific ST and T wave abnormality  Abnormal ECG  Reviewed by Maurice OWUSU         Imaging:  XR Chest 2 Views   Final Result   IMPRESSION: Negative chest. Lungs clear.      RAKEL DAVID MD            SYSTEM ID:  [id number]         Report per radiology    Laboratory:  Labs Ordered and Resulted from Time of ED Arrival to Time of ED Departure   BASIC METABOLIC PANEL - Abnormal       Result Value    Sodium 139      Potassium 3.7      Chloride 103      Carbon Dioxide (CO2) 22      Anion Gap 14      Urea Nitrogen 11.2      Creatinine 0.60      GFR Estimate >90      Calcium 8.9      Glucose 102 (*)    ROUTINE UA WITH MICROSCOPIC REFLEX TO CULTURE - Abnormal    Color Urine Light Yellow      Appearance Urine Clear      Glucose Urine Negative      Bilirubin Urine Negative      Ketones Urine 20 (*)     Specific Gravity Urine 1.008      Blood Urine Negative      pH Urine 5.5      Protein Albumin Urine Negative      Urobilinogen Urine Normal      Nitrite Urine Negative      Leukocyte Esterase Urine Negative      Bacteria Urine Few (*)     RBC Urine <1      WBC Urine 1      Squamous Epithelials Urine 2 (*)    TROPONIN T, HIGH SENSITIVITY - Normal    Troponin T, High Sensitivity <6     CBC WITH PLATELETS AND DIFFERENTIAL    WBC Count 5.2      RBC Count 4.46      Hemoglobin 14.1      Hematocrit 40.7      MCV 91      MCH 31.6      MCHC 34.6      RDW 11.9      Platelet Count 264      % Neutrophils 71      % Lymphocytes 22      % Monocytes 5      % Eosinophils 1      % Basophils 1      % Immature Granulocytes 0      NRBCs per 100 WBC 0      Absolute Neutrophils 3.7      Absolute Lymphocytes 1.1      Absolute Monocytes 0.3      Absolute Eosinophils 0.0      Absolute Basophils 0.1      Absolute Immature Granulocytes 0.0      Absolute NRBCs 0.0            Emergency Department Course & Assessments:      Interventions:  Medications   sodium chloride 0.9% BOLUS 1,000 mL (has no  administration in time range)          Independent Interpretation (X-rays, CTs, rhythm strip):  Yes I have independently reviewed the patient's chest x-ray, no obvious pneumothorax noted          Social Determinants of Health affecting care:   None      Disposition:  The patient was discharged to home.     Impression & Plan    MIPS (If applicable):  N/A        Medical Decision Making:  This is a very pleasant 48-year-old female who presents the emergency room with what appears to be possible episodes of near syncope and palpitations.  Thankfully she does not have a significant cardiac history, although her blood pressure is high here, which he states is abnormal for her.  She has not had any times where she felt she was about to faint, and here in the ER I do not see any evidence of a cardiac issue, she has no pleurisy or hypoxia or unexplained tachycardia in the ER to evidence of pulmonary embolism.  She has had no issues on the cardiac monitor while being here in the ER and none of her symptoms are exertional.  Unclear cause, does state she is going through a lot of stress at work, and after having a discussion we discussed how the neck step would be a cardiac monitor.  I did offer a Holter monitor here prior to discharge, but after discussing the case, and learning about the timing involved, the patient chose to follow with her regular doctor next week to explore the possibility of a device monitor at that time.  She is highly reliable appearing, I do think that she will come back to the ER if anything gets worse, and has great family support of her male partner.  She knows that no clear cause of her symptoms was found here today and that there is a significant amount of diagnostic uncertainty that persists.    Diagnosis:    ICD-10-CM    1. Palpitations  R00.2       2. Near syncope  R55            Discharge Medications:  Discharge Medication List as of 5/16/2024 10:08 AM             5/16/2024   Maurice  Piotr Lopez*        Piotr Richards MD  05/16/24 0487

## (undated) DEVICE — LINEN TOWEL PACK X5 5464

## (undated) DEVICE — SUCTION TIP YANKAUER W/O VENT K86

## (undated) DEVICE — ENDO TROCAR FIRST ENTRY KII FIOS Z-THRD 05X100MM CTF03

## (undated) DEVICE — ESU GROUND PAD UNIVERSAL W/O CORD

## (undated) DEVICE — TUBING CYSTO/BLADDER IRRIG SET 80" 06544-01

## (undated) DEVICE — ENDO SCOPE WARMER LF TM500

## (undated) DEVICE — PREP CHLORAPREP 26ML TINTED ORANGE  260815

## (undated) DEVICE — ESU LIGASURE LAPAROSCOPIC BLUNT TIP SEALER 5MMX37CM LF1837

## (undated) DEVICE — BARRIER INTERCEED 3X4" 4350

## (undated) DEVICE — SOL WATER IRRIG 1000ML BOTTLE 2F7114

## (undated) DEVICE — NDL 25GA 1.5" 305127

## (undated) DEVICE — SOL NACL 0.9% INJ 1000ML BAG 2B1324X

## (undated) DEVICE — ENDO APPLICATOR SURGIFLO PLASMA COBLATION MS1995

## (undated) DEVICE — ESU CORD MONOPOLAR 10'  E0510

## (undated) DEVICE — NDL INSUFFLATION 13GA 120MM C2201

## (undated) DEVICE — SOL WATER IRRIG 3000ML BAG 2B7117

## (undated) DEVICE — EVAC SYSTEM CLEAR FLOW SC082500

## (undated) DEVICE — ENDO TROCAR SLEEVE KII Z-THREADED 05X100MM CTS02

## (undated) DEVICE — SUCTION CANISTER MEDIVAC LINER 3000ML W/LID 65651-530

## (undated) DEVICE — NDL COUNTER 40CT  31142311

## (undated) DEVICE — GLOVE PROTEXIS W/NEU-THERA 6.5  2D73TE65

## (undated) DEVICE — TUBING SUCTION 12"X1/4" N612

## (undated) DEVICE — GOWN IMPERVIOUS SPECIALTY XL/XLONG 39049

## (undated) DEVICE — CATH TRAY FOLEY 16FR BARDEX W/DRAIN BAG STATLOCK 300316A

## (undated) DEVICE — SYR 10ML FINGER CONTROL W/O NDL 309695

## (undated) DEVICE — ADH SKIN CLOSURE PREMIERPRO EXOFIN 1.0ML 3470

## (undated) DEVICE — Device

## (undated) DEVICE — GLOVE PROTEXIS MICRO 6.5  2D73PM65

## (undated) DEVICE — DECANTER BAG 2002S

## (undated) DEVICE — GLOVE PROTEXIS MICRO 7.0  2D73PM70

## (undated) DEVICE — SUCTION IRR STRYKERFLOW II W/TIP 250-070-520

## (undated) DEVICE — SOL NACL 0.9% INJ 250ML BAG 2B1322Q

## (undated) DEVICE — ESU HOLDER LAP INST DISP PURPLE LONG 330MM H-PRO-330

## (undated) DEVICE — ESU PENCIL W/HOLSTER E2350H

## (undated) DEVICE — SU MONOCRYL 4-0 PS-2 18" UND Y496G

## (undated) RX ORDER — PROPOFOL 10 MG/ML
INJECTION, EMULSION INTRAVENOUS
Status: DISPENSED
Start: 2018-10-12

## (undated) RX ORDER — DEXAMETHASONE SODIUM PHOSPHATE 4 MG/ML
INJECTION, SOLUTION INTRA-ARTICULAR; INTRALESIONAL; INTRAMUSCULAR; INTRAVENOUS; SOFT TISSUE
Status: DISPENSED
Start: 2019-10-24

## (undated) RX ORDER — OXYCODONE HYDROCHLORIDE 5 MG/1
TABLET ORAL
Status: DISPENSED
Start: 2018-10-12

## (undated) RX ORDER — LIDOCAINE HYDROCHLORIDE 20 MG/ML
INJECTION, SOLUTION EPIDURAL; INFILTRATION; INTRACAUDAL; PERINEURAL
Status: DISPENSED
Start: 2018-10-12

## (undated) RX ORDER — ONDANSETRON 2 MG/ML
INJECTION INTRAMUSCULAR; INTRAVENOUS
Status: DISPENSED
Start: 2019-10-24

## (undated) RX ORDER — HYDROMORPHONE HYDROCHLORIDE 1 MG/ML
INJECTION, SOLUTION INTRAMUSCULAR; INTRAVENOUS; SUBCUTANEOUS
Status: DISPENSED
Start: 2018-10-12

## (undated) RX ORDER — SCOLOPAMINE TRANSDERMAL SYSTEM 1 MG/1
PATCH, EXTENDED RELEASE TRANSDERMAL
Status: DISPENSED
Start: 2018-10-12

## (undated) RX ORDER — FENTANYL CITRATE 50 UG/ML
INJECTION, SOLUTION INTRAMUSCULAR; INTRAVENOUS
Status: DISPENSED
Start: 2018-10-12

## (undated) RX ORDER — PROPOFOL 10 MG/ML
INJECTION, EMULSION INTRAVENOUS
Status: DISPENSED
Start: 2019-10-24

## (undated) RX ORDER — ACETAMINOPHEN 325 MG/1
TABLET ORAL
Status: DISPENSED
Start: 2018-10-12

## (undated) RX ORDER — ONDANSETRON 2 MG/ML
INJECTION INTRAMUSCULAR; INTRAVENOUS
Status: DISPENSED
Start: 2018-10-12

## (undated) RX ORDER — NEOSTIGMINE METHYLSULFATE 1 MG/ML
VIAL (ML) INJECTION
Status: DISPENSED
Start: 2019-10-24

## (undated) RX ORDER — FENTANYL CITRATE 50 UG/ML
INJECTION, SOLUTION INTRAMUSCULAR; INTRAVENOUS
Status: DISPENSED
Start: 2019-10-24

## (undated) RX ORDER — KETOROLAC TROMETHAMINE 30 MG/ML
INJECTION, SOLUTION INTRAMUSCULAR; INTRAVENOUS
Status: DISPENSED
Start: 2019-10-24

## (undated) RX ORDER — KETOROLAC TROMETHAMINE 30 MG/ML
INJECTION, SOLUTION INTRAMUSCULAR; INTRAVENOUS
Status: DISPENSED
Start: 2018-10-12

## (undated) RX ORDER — DEXAMETHASONE SODIUM PHOSPHATE 4 MG/ML
INJECTION, SOLUTION INTRA-ARTICULAR; INTRALESIONAL; INTRAMUSCULAR; INTRAVENOUS; SOFT TISSUE
Status: DISPENSED
Start: 2018-10-12

## (undated) RX ORDER — HYDROMORPHONE HYDROCHLORIDE 1 MG/ML
INJECTION, SOLUTION INTRAMUSCULAR; INTRAVENOUS; SUBCUTANEOUS
Status: DISPENSED
Start: 2019-10-24

## (undated) RX ORDER — GABAPENTIN 300 MG/1
CAPSULE ORAL
Status: DISPENSED
Start: 2018-10-12

## (undated) RX ORDER — CEFAZOLIN SODIUM 2 G/100ML
INJECTION, SOLUTION INTRAVENOUS
Status: DISPENSED
Start: 2018-10-12

## (undated) RX ORDER — GLYCOPYRROLATE 0.2 MG/ML
INJECTION, SOLUTION INTRAMUSCULAR; INTRAVENOUS
Status: DISPENSED
Start: 2019-10-24

## (undated) RX ORDER — LIDOCAINE HYDROCHLORIDE 20 MG/ML
INJECTION, SOLUTION EPIDURAL; INFILTRATION; INTRACAUDAL; PERINEURAL
Status: DISPENSED
Start: 2019-10-24

## (undated) RX ORDER — GLYCOPYRROLATE 0.2 MG/ML
INJECTION, SOLUTION INTRAMUSCULAR; INTRAVENOUS
Status: DISPENSED
Start: 2018-10-12